# Patient Record
Sex: FEMALE | Race: WHITE | URBAN - METROPOLITAN AREA
[De-identification: names, ages, dates, MRNs, and addresses within clinical notes are randomized per-mention and may not be internally consistent; named-entity substitution may affect disease eponyms.]

---

## 2017-11-17 ENCOUNTER — APPOINTMENT (OUTPATIENT)
Dept: MRI IMAGING | Facility: CLINIC | Age: 67
DRG: 065 | End: 2017-11-17
Attending: EMERGENCY MEDICINE
Payer: MEDICARE

## 2017-11-17 ENCOUNTER — APPOINTMENT (OUTPATIENT)
Dept: CT IMAGING | Facility: CLINIC | Age: 67
DRG: 065 | End: 2017-11-17
Attending: EMERGENCY MEDICINE
Payer: MEDICARE

## 2017-11-17 ENCOUNTER — HOSPITAL ENCOUNTER (INPATIENT)
Facility: CLINIC | Age: 67
LOS: 2 days | Discharge: HOME | DRG: 065 | End: 2017-11-19
Attending: EMERGENCY MEDICINE | Admitting: HOSPITALIST
Payer: MEDICARE

## 2017-11-17 DIAGNOSIS — H53.8 BLURRING OF VISUAL IMAGE OF LEFT EYE: ICD-10-CM

## 2017-11-17 DIAGNOSIS — I63.9 OCCIPITAL STROKE (H): Primary | ICD-10-CM

## 2017-11-17 DIAGNOSIS — I48.0 PAROXYSMAL A-FIB (H): ICD-10-CM

## 2017-11-17 LAB
ANION GAP SERPL CALCULATED.3IONS-SCNC: 6 MMOL/L (ref 3–14)
BASOPHILS # BLD AUTO: 0 10E9/L (ref 0–0.2)
BASOPHILS NFR BLD AUTO: 0.2 %
BUN SERPL-MCNC: 20 MG/DL (ref 7–30)
CALCIUM SERPL-MCNC: 8.9 MG/DL (ref 8.5–10.1)
CHLORIDE SERPL-SCNC: 102 MMOL/L (ref 94–109)
CO2 SERPL-SCNC: 30 MMOL/L (ref 20–32)
CREAT SERPL-MCNC: 0.73 MG/DL (ref 0.52–1.04)
CRP SERPL-MCNC: <2.9 MG/L (ref 0–8)
DIFFERENTIAL METHOD BLD: ABNORMAL
EOSINOPHIL # BLD AUTO: 0.1 10E9/L (ref 0–0.7)
EOSINOPHIL NFR BLD AUTO: 0.8 %
ERYTHROCYTE [DISTWIDTH] IN BLOOD BY AUTOMATED COUNT: 12.1 % (ref 10–15)
ERYTHROCYTE [SEDIMENTATION RATE] IN BLOOD BY WESTERGREN METHOD: 5 MM/H (ref 0–30)
GFR SERPL CREATININE-BSD FRML MDRD: 79 ML/MIN/1.7M2
GLUCOSE SERPL-MCNC: 103 MG/DL (ref 70–99)
HBA1C MFR BLD: 5.9 % (ref 4.3–6)
HCT VFR BLD AUTO: 52 % (ref 35–47)
HGB BLD-MCNC: 17.5 G/DL (ref 11.7–15.7)
IMM GRANULOCYTES # BLD: 0 10E9/L (ref 0–0.4)
IMM GRANULOCYTES NFR BLD: 0.4 %
LYMPHOCYTES # BLD AUTO: 3.8 10E9/L (ref 0.8–5.3)
LYMPHOCYTES NFR BLD AUTO: 33.8 %
MCH RBC QN AUTO: 28.1 PG (ref 26.5–33)
MCHC RBC AUTO-ENTMCNC: 33.7 G/DL (ref 31.5–36.5)
MCV RBC AUTO: 84 FL (ref 78–100)
MONOCYTES # BLD AUTO: 0.5 10E9/L (ref 0–1.3)
MONOCYTES NFR BLD AUTO: 4.8 %
NEUTROPHILS # BLD AUTO: 6.7 10E9/L (ref 1.6–8.3)
NEUTROPHILS NFR BLD AUTO: 60 %
NRBC # BLD AUTO: 0 10*3/UL
NRBC BLD AUTO-RTO: 0 /100
PLATELET # BLD AUTO: 248 10E9/L (ref 150–450)
POTASSIUM SERPL-SCNC: 3.7 MMOL/L (ref 3.4–5.3)
RBC # BLD AUTO: 6.23 10E12/L (ref 3.8–5.2)
SODIUM SERPL-SCNC: 138 MMOL/L (ref 133–144)
T4 FREE SERPL-MCNC: 1.28 NG/DL (ref 0.76–1.46)
TSH SERPL DL<=0.005 MIU/L-ACNC: 4.14 MU/L (ref 0.4–4)
WBC # BLD AUTO: 11.1 10E9/L (ref 4–11)

## 2017-11-17 PROCEDURE — 85025 COMPLETE CBC W/AUTO DIFF WBC: CPT | Performed by: EMERGENCY MEDICINE

## 2017-11-17 PROCEDURE — 25000128 H RX IP 250 OP 636: Performed by: EMERGENCY MEDICINE

## 2017-11-17 PROCEDURE — A9270 NON-COVERED ITEM OR SERVICE: HCPCS | Mod: GY | Performed by: HOSPITALIST

## 2017-11-17 PROCEDURE — 84439 ASSAY OF FREE THYROXINE: CPT | Performed by: EMERGENCY MEDICINE

## 2017-11-17 PROCEDURE — 99285 EMERGENCY DEPT VISIT HI MDM: CPT | Mod: 25

## 2017-11-17 PROCEDURE — 70496 CT ANGIOGRAPHY HEAD: CPT

## 2017-11-17 PROCEDURE — 96375 TX/PRO/DX INJ NEW DRUG ADDON: CPT

## 2017-11-17 PROCEDURE — 93005 ELECTROCARDIOGRAM TRACING: CPT

## 2017-11-17 PROCEDURE — 25000128 H RX IP 250 OP 636: Performed by: RADIOLOGY

## 2017-11-17 PROCEDURE — 25000132 ZZH RX MED GY IP 250 OP 250 PS 637: Mod: GY | Performed by: HOSPITALIST

## 2017-11-17 PROCEDURE — 99223 1ST HOSP IP/OBS HIGH 75: CPT | Mod: AI | Performed by: HOSPITALIST

## 2017-11-17 PROCEDURE — 12000007 ZZH R&B INTERMEDIATE

## 2017-11-17 PROCEDURE — A9270 NON-COVERED ITEM OR SERVICE: HCPCS | Mod: GY | Performed by: EMERGENCY MEDICINE

## 2017-11-17 PROCEDURE — 86140 C-REACTIVE PROTEIN: CPT | Performed by: EMERGENCY MEDICINE

## 2017-11-17 PROCEDURE — 80048 BASIC METABOLIC PNL TOTAL CA: CPT | Performed by: EMERGENCY MEDICINE

## 2017-11-17 PROCEDURE — 96365 THER/PROPH/DIAG IV INF INIT: CPT

## 2017-11-17 PROCEDURE — 96376 TX/PRO/DX INJ SAME DRUG ADON: CPT

## 2017-11-17 PROCEDURE — 83036 HEMOGLOBIN GLYCOSYLATED A1C: CPT | Performed by: EMERGENCY MEDICINE

## 2017-11-17 PROCEDURE — 84443 ASSAY THYROID STIM HORMONE: CPT | Performed by: EMERGENCY MEDICINE

## 2017-11-17 PROCEDURE — 70450 CT HEAD/BRAIN W/O DYE: CPT | Mod: XS

## 2017-11-17 PROCEDURE — A9585 GADOBUTROL INJECTION: HCPCS | Performed by: RADIOLOGY

## 2017-11-17 PROCEDURE — 25000125 ZZHC RX 250: Performed by: EMERGENCY MEDICINE

## 2017-11-17 PROCEDURE — 85652 RBC SED RATE AUTOMATED: CPT | Performed by: EMERGENCY MEDICINE

## 2017-11-17 PROCEDURE — 70553 MRI BRAIN STEM W/O & W/DYE: CPT

## 2017-11-17 PROCEDURE — 25000132 ZZH RX MED GY IP 250 OP 250 PS 637: Mod: GY | Performed by: EMERGENCY MEDICINE

## 2017-11-17 RX ORDER — METOPROLOL TARTRATE 25 MG/1
25 TABLET, FILM COATED ORAL ONCE
Status: COMPLETED | OUTPATIENT
Start: 2017-11-17 | End: 2017-11-17

## 2017-11-17 RX ORDER — FENOFIBRATE 160 MG/1
160 TABLET ORAL DAILY
COMMUNITY

## 2017-11-17 RX ORDER — ONDANSETRON 2 MG/ML
4 INJECTION INTRAMUSCULAR; INTRAVENOUS EVERY 30 MIN PRN
Status: DISCONTINUED | OUTPATIENT
Start: 2017-11-17 | End: 2017-11-17

## 2017-11-17 RX ORDER — HYDROCHLOROTHIAZIDE 25 MG/1
25 TABLET ORAL DAILY
Status: DISCONTINUED | OUTPATIENT
Start: 2017-11-17 | End: 2017-11-18

## 2017-11-17 RX ORDER — ONDANSETRON 2 MG/ML
4 INJECTION INTRAMUSCULAR; INTRAVENOUS EVERY 6 HOURS PRN
Status: DISCONTINUED | OUTPATIENT
Start: 2017-11-17 | End: 2017-11-19 | Stop reason: HOSPADM

## 2017-11-17 RX ORDER — IOPAMIDOL 755 MG/ML
500 INJECTION, SOLUTION INTRAVASCULAR ONCE
Status: COMPLETED | OUTPATIENT
Start: 2017-11-17 | End: 2017-11-17

## 2017-11-17 RX ORDER — METOPROLOL TARTRATE 1 MG/ML
5 INJECTION, SOLUTION INTRAVENOUS ONCE
Status: COMPLETED | OUTPATIENT
Start: 2017-11-17 | End: 2017-11-17

## 2017-11-17 RX ORDER — AMOXICILLIN 250 MG
1 CAPSULE ORAL 2 TIMES DAILY PRN
Status: DISCONTINUED | OUTPATIENT
Start: 2017-11-17 | End: 2017-11-19 | Stop reason: HOSPADM

## 2017-11-17 RX ORDER — METOPROLOL TARTRATE 25 MG/1
25 TABLET, FILM COATED ORAL 2 TIMES DAILY
Status: DISCONTINUED | OUTPATIENT
Start: 2017-11-17 | End: 2017-11-17

## 2017-11-17 RX ORDER — FENOFIBRATE 160 MG/1
160 TABLET ORAL DAILY
Status: DISCONTINUED | OUTPATIENT
Start: 2017-11-17 | End: 2017-11-19 | Stop reason: HOSPADM

## 2017-11-17 RX ORDER — SIMVASTATIN 40 MG
40 TABLET ORAL AT BEDTIME
Status: DISCONTINUED | OUTPATIENT
Start: 2017-11-17 | End: 2017-11-19 | Stop reason: HOSPADM

## 2017-11-17 RX ORDER — GADOBUTROL 604.72 MG/ML
15 INJECTION INTRAVENOUS ONCE
Status: COMPLETED | OUTPATIENT
Start: 2017-11-17 | End: 2017-11-17

## 2017-11-17 RX ORDER — NALOXONE HYDROCHLORIDE 0.4 MG/ML
.1-.4 INJECTION, SOLUTION INTRAMUSCULAR; INTRAVENOUS; SUBCUTANEOUS
Status: DISCONTINUED | OUTPATIENT
Start: 2017-11-17 | End: 2017-11-19 | Stop reason: HOSPADM

## 2017-11-17 RX ORDER — ONDANSETRON 4 MG/1
4 TABLET, ORALLY DISINTEGRATING ORAL EVERY 6 HOURS PRN
Status: DISCONTINUED | OUTPATIENT
Start: 2017-11-17 | End: 2017-11-19 | Stop reason: HOSPADM

## 2017-11-17 RX ORDER — GLUCOSAMINE SULFATE 500 MG
1 CAPSULE ORAL 2 TIMES DAILY
COMMUNITY

## 2017-11-17 RX ORDER — ACETAMINOPHEN 325 MG/1
650 TABLET ORAL EVERY 4 HOURS PRN
Status: DISCONTINUED | OUTPATIENT
Start: 2017-11-17 | End: 2017-11-19 | Stop reason: HOSPADM

## 2017-11-17 RX ORDER — METOPROLOL TARTRATE 50 MG
50 TABLET ORAL 2 TIMES DAILY
Status: DISCONTINUED | OUTPATIENT
Start: 2017-11-18 | End: 2017-11-19 | Stop reason: HOSPADM

## 2017-11-17 RX ORDER — ACETAMINOPHEN 325 MG/1
975 TABLET ORAL ONCE
Status: COMPLETED | OUTPATIENT
Start: 2017-11-17 | End: 2017-11-17

## 2017-11-17 RX ORDER — AMOXICILLIN 250 MG
2 CAPSULE ORAL 2 TIMES DAILY PRN
Status: DISCONTINUED | OUTPATIENT
Start: 2017-11-17 | End: 2017-11-19 | Stop reason: HOSPADM

## 2017-11-17 RX ADMIN — METOPROLOL TARTRATE 5 MG: 5 INJECTION INTRAVENOUS at 19:36

## 2017-11-17 RX ADMIN — METOPROLOL TARTRATE 25 MG: 25 TABLET ORAL at 23:58

## 2017-11-17 RX ADMIN — ONDANSETRON 4 MG: 2 INJECTION INTRAMUSCULAR; INTRAVENOUS at 15:25

## 2017-11-17 RX ADMIN — METOPROLOL TARTRATE 5 MG: 5 INJECTION INTRAVENOUS at 20:11

## 2017-11-17 RX ADMIN — ASPIRIN 325 MG: 325 TABLET, DELAYED RELEASE ORAL at 21:33

## 2017-11-17 RX ADMIN — HEPARIN SODIUM 1050 UNITS/HR: 10000 INJECTION, SOLUTION INTRAVENOUS at 19:57

## 2017-11-17 RX ADMIN — METOPROLOL TARTRATE 25 MG: 25 TABLET ORAL at 21:32

## 2017-11-17 RX ADMIN — IOPAMIDOL 70 ML: 755 INJECTION, SOLUTION INTRAVENOUS at 15:47

## 2017-11-17 RX ADMIN — HYDROCHLOROTHIAZIDE 25 MG: 25 TABLET ORAL at 23:58

## 2017-11-17 RX ADMIN — GADOBUTROL 15 ML: 604.72 INJECTION INTRAVENOUS at 16:52

## 2017-11-17 RX ADMIN — ACETAMINOPHEN 975 MG: 325 TABLET, FILM COATED ORAL at 15:25

## 2017-11-17 RX ADMIN — FENOFIBRATE 160 MG: 160 TABLET ORAL at 21:32

## 2017-11-17 RX ADMIN — SIMVASTATIN 40 MG: 40 TABLET, FILM COATED ORAL at 21:32

## 2017-11-17 RX ADMIN — SODIUM CHLORIDE 80 ML: 9 INJECTION, SOLUTION INTRAVENOUS at 15:47

## 2017-11-17 ASSESSMENT — ENCOUNTER SYMPTOMS
FEVER: 0
NECK STIFFNESS: 0
HEADACHES: 1
ABDOMINAL PAIN: 0
VOMITING: 0

## 2017-11-17 ASSESSMENT — ACTIVITIES OF DAILY LIVING (ADL)
BATHING: 0-->INDEPENDENT
RETIRED_EATING: 0-->INDEPENDENT
SWALLOWING: 0-->SWALLOWS FOODS/LIQUIDS WITHOUT DIFFICULTY
RETIRED_COMMUNICATION: 0-->UNDERSTANDS/COMMUNICATES WITHOUT DIFFICULTY
FALL_HISTORY_WITHIN_LAST_SIX_MONTHS: NO
DRESS: 0-->INDEPENDENT
COGNITION: 0 - NO COGNITION ISSUES REPORTED
AMBULATION: 1-->ASSISTIVE EQUIPMENT
TRANSFERRING: 1-->ASSISTIVE EQUIPMENT
TOILETING: 1-->ASSISTIVE EQUIPMENT

## 2017-11-17 NOTE — Clinical Note
Admitting Physician: CARMITA POZO [[id number]]   [id number]   Bed Type: Adult Med/Surg [46]   Special needs: Fall Risk [8]   Bed request comments: Approx 300 lbs / independent transfer / bed request time 7589

## 2017-11-17 NOTE — IP AVS SNAPSHOT
[place] [address] [facility]    [address]    [facility] [address]    Phone:  [phone]    Fax:  [phone]                                       After Visit Summary   11/17/2017    Es Valdez    MRN: [id number]           After Visit Summary Signature Page     I have received my discharge instructions, and my questions have been answered. I have discussed any challenges I see with this plan with the nurse or doctor.    ..........................................................................................................................................  Patient/Patient Representative Signature      ..........................................................................................................................................  Patient Representative Print Name and Relationship to Patient    ..................................................               ................................................  Date                                            Time    ..........................................................................................................................................  Reviewed by Signature/Title    ...................................................              ..............................................  Date                                                            Time

## 2017-11-17 NOTE — ED NOTES
"[facility]  ED Nurse Handoff Report    Es Valdez is a 67 year old female   ED Chief complaint: Headache and vision changes  . ED Diagnosis:   Final diagnoses:   None     Allergies:   Allergies   Allergen Reactions     Seasonal Allergies        Code Status: Full Code  Activity level - Baseline/Home:  Independent. Activity Level - Current:   Independent. Lift room needed: No. Bariatric: She is right around 300lb but is independent   Needed: No   Isolation: No. Infection: Not Applicable.     Vital Signs:   Vitals:    11/17/17 1600 11/17/17 1606 11/17/17 1607 11/17/17 1740   BP: 99/80 (!) 118/98 98/88    Pulse:       Resp:       Temp:       SpO2:  (!) 89% 94% 96%   Weight:       Height:           Cardiac Rhythm:  ,      Pain level: 0-10 Pain Scale: 5  Patient confused: No. Patient Falls Risk: Yes.   Elimination Status: Has voided   Patient Report - Initial Complaint: Pt states left eye had some areas in her field of vision where she couldn't see starting Wednesday. Focused Assessment: PERRLA, gait is steady but she does get slightly dizzy when changing positions.  Peripheral vision is normal.  Pt states a mild \"sinus headache\" but she has had migraines back before 2011 she stated.  States she tried Tylenol and Motrin without relief.   Tests Performed: EKG, labs, CT, MRI. Abnormal Results: Multiple infarcts occipital lobe.   Treatments provided: Monitoring, Tylenol and Zofran  Family Comments:  at bedside  OBS brochure/video discussed/provided to patient:  No  ED Medications:   Medications   ondansetron (ZOFRAN) injection 4 mg (4 mg Intravenous Given 11/17/17 1525)   acetaminophen (TYLENOL) tablet 975 mg (975 mg Oral Given 11/17/17 1525)   0.9% sodium chloride BOLUS (0 mLs Intravenous Stopped 11/17/17 1548)   iopamidol (ISOVUE-370) solution 500 mL (70 mLs Intravenous Given 11/17/17 1547)   gadobutrol (GADAVIST) injection 15 mL (15 mLs Intravenous Given 11/17/17 1652)     Drips " infusing:  No  For the majority of the shift, the patient's behavior Green. Interventions performed were monitoring.     Severe Sepsis OR Septic Shock Diagnosis Present: No      ED Nurse Name/Phone Number: LUIS Barrow RN  5:41 PM    RECEIVING UNIT ED HANDOFF REVIEW    Above ED Nurse Handoff Report was reviewed: Yes  Reviewed by: ANAHI HUMPHRIES on November 17, 2017 at 7:28 PM

## 2017-11-17 NOTE — IP AVS SNAPSHOT
MRN:[id number]                      After Visit Summary   11/17/2017    Es Valdez    MRN: [id number]           Thank you!     Thank you for choosing [facility] for your care. Our goal is always to provide you with excellent care. Hearing back from our patients is one way we can continue to improve our services. Please take a few minutes to complete the written survey that you may receive in the mail after you visit. If you would like to speak to someone directly about your visit please contact Patient Relations at [phone]. Thank you!          Patient Information     Date Of Birth          1950        Designated Caregiver       Most Recent Value    Caregiver    Will someone help with your care after discharge? no      About your hospital stay     You were admitted on:  November 17, 2017 You last received care in the:  [address] [facility]    You were discharged on:  November 19, 2017        Reason for your hospital stay       You were hospitalized for a new stroke that caused blurry vision.  This is is likely from a clot forming in your heart from atrial fibrillation, an abnormal hear rhythm that you are going in and out of.  We recommend a blood thinner indefinitely which we discussed to prevent stroke.  Stop taking your aspirin due to increased bleeding risk with this.  Recommend not driving until vision symptoms improve and you get OK from you clinic provider.  Your metoprolol dose has been increased to help with heart rate control for the afib.                  Who to Call     For medical emergencies, please call 911.  For non-urgent questions about your medical care, please call your primary care provider or clinic, [phone]          Attending Provider     Provider Specialty    Denae Wiggins MD Emergency Medicine    VinesAugusto MD Family Practice       Primary Care Provider Office Phone # Fax #    [facility]  [facility] [phone]      After Care Instructions     Activity       Your activity upon discharge: activity as tolerated            Diet       Follow this diet upon discharge: Orders Placed This Encounter      Combination Diet Regular Diet Adult; Thin Liquids (water, ice chips, juice, milk, gelatin, ice cream, etc)                  Follow-up Appointments     Follow-up and recommended labs and tests        Follow up with primary care provider, [facility], within 7 days to evaluate medication change for stroke.  No follow up labs or test are needed.                  Additional Services     Occupational Therapy Referral       *This therapy referral will be filtered to a centralized scheduling office at [facility] and the patient will receive a call to schedule an appointment at a [facility] location most convenient for them. *     [facility] provides Occupational Therapy evaluation and treatment and many specialty services across the [facility] system.  If requesting a specialty program, please choose from the list below.    If you have not heard from the scheduling office within 2 business days, please call [phone] for all locations, with the exception of [place], please call [phone].     Treatment: Evaluation & Treatment  Special Instructions/Modalities: Visual rehabilitation following stroke  Special Programs: Low Vision    Please be aware that coverage of these services is subject to the terms and limitations of your health insurance plan.  Call member services at your health plan with any benefit or coverage questions.      **Note to Provider:  If you are referring outside of [facility] for the therapy appointment, please list the name of the location in the \"special instructions\" above, print the referral and give to the patient to schedule the appointment.                  Pending Results     Date and Time Order Name Status " "Description    2017 0906 EKG 12-lead, tracing only Preliminary             Statement of Approval     Ordered          17 0954  I have reviewed and agree with all the recommendations and orders detailed in this document.  EFFECTIVE NOW     Approved and electronically signed by:  Favio Fortune MD             Admission Information     Date & Time Provider Department Dept. Phone    2017 Augusto Vines MD [place] [address] [facility] [phone]      Your Vitals Were     Blood Pressure Pulse Temperature Respirations Height Weight    146/77 (BP Location: Left arm) 70 98.5  F (36.9  C) (Oral) 18 1.626 m (5' 4\") 138.8 kg (305 lb 14.4 oz)    Pulse Oximetry BMI (Body Mass Index)                94% 52.51 kg/m2          [employer] Information     [employer] lets you send messages to your doctor, view your test results, renew your prescriptions, schedule appointments and more. To sign up, go to [email or web address] . Click on \"Log in\" on the left side of the screen, which will take you to the Welcome page. Then click on \"Sign up Now\" on the right side of the page.     You will be asked to enter the access code listed below, as well as some personal information. Please follow the directions to create your username and password.     Your access code is: [email or web address]  Expires: 2018 10:21 AM     Your access code will  in 90 days. If you need help or a new code, please call your [place] clinic or [phone].        Care EveryWhere ID     This is your Care EveryWhere ID. This could be used by other organizations to access your [place] medical records  [id number]        Equal Access to Services     [facility] HILDA AH: Hadii marian Varela, uriel you, qaeula kaalmada hallie, celeste estrada. So [facility] [phone].    ATENCIÓN: Si habla español, tiene a jain disposición servicios gratuitos de asistencia lingüística. Llame al [phone].    We comply " with applicable federal civil rights laws and [place] laws. We do not discriminate on the basis of race, color, national origin, age, disability, sex, sexual orientation, or gender identity.               Review of your medicines      START taking        Dose / Directions    rivaroxaban ANTICOAGULANT 20 MG Tabs tablet   Commonly known as:  XARELTO   Used for:  Occipital stroke (H), Paroxysmal a-fib (H)   Notes to Patient:  Take at 2 pm Monday, then 5 pm Tuesday and with dinner daily.        Dose:  20 mg   Take 1 tablet (20 mg) by mouth daily (with dinner)   Quantity:  30 tablet   Refills:  1         CONTINUE these medicines which may have CHANGED, or have new prescriptions. If we are uncertain of the size of tablets/capsules you have at home, strength may be listed as something that might have changed.        Dose / Directions    metoprolol 50 MG tablet   Commonly known as:  LOPRESSOR   This may have changed:    - medication strength  - how much to take   Used for:  Paroxysmal a-fib (H)        Dose:  50 mg   Take 1 tablet (50 mg) by mouth 2 times daily   Quantity:  60 tablet   Refills:  1         CONTINUE these medicines which have NOT CHANGED        Dose / Directions    fenofibrate 160 MG tablet        Dose:  160 mg   Take 160 mg by mouth daily   Refills:  0       FISH OIL PO        Dose:  1 capsule   Take 1 capsule by mouth 2 times daily   Refills:  0       glucosamine 500 MG Caps        Dose:  1 tablet   Take 1 tablet by mouth 2 times daily   Refills:  0       HYDROCHLOROTHIAZIDE PO        Dose:  25 mg   Take 25 mg by mouth daily   Refills:  0       OMEPRAZOLE PO        Dose:  20 mg   Take 20 mg by mouth   Refills:  0       SIMVASTATIN PO        Dose:  40 mg   Take 40 mg by mouth At Bedtime   Refills:  0       ZYRTEC ALLERGY PO        Dose:  10 mg   Take 10 mg by mouth   Refills:  0         STOP taking     ASPIRIN PO                Where to get your medicines      These medications were sent to [facility]  [place], [place] - [address] [address]  [address] [address], [facility] [address]     Phone:  [phone]     metoprolol 50 MG tablet    rivaroxaban ANTICOAGULANT 20 MG Tabs tablet                Protect others around you: Learn how to safely use, store and throw away your medicines at www.disposemymeds.org.             Medication List: This is a list of all your medications and when to take them. Check marks below indicate your daily home schedule. Keep this list as a reference.      Medications           Morning Afternoon Evening Bedtime As Needed    fenofibrate 160 MG tablet   Take 160 mg by mouth daily   Last time this was given:  160 mg on 11/19/2017  9:18 AM                                   FISH OIL PO   Take 1 capsule by mouth 2 times daily                                      glucosamine 500 MG Caps   Take 1 tablet by mouth 2 times daily                                      HYDROCHLOROTHIAZIDE PO   Take 25 mg by mouth daily   Last time this was given:  25 mg on 11/17/2017 11:58 PM                                   metoprolol 50 MG tablet   Commonly known as:  LOPRESSOR   Take 1 tablet (50 mg) by mouth 2 times daily   Last time this was given:  50 mg on 11/19/2017  9:18 AM                                      OMEPRAZOLE PO   Take 20 mg by mouth   Last time this was given:  20 mg on 11/19/2017  9:18 AM                                   rivaroxaban ANTICOAGULANT 20 MG Tabs tablet   Commonly known as:  XARELTO   Take 1 tablet (20 mg) by mouth daily (with dinner)   Last time this was given:  20 mg on 11/19/2017 10:32 AM   Notes to Patient:  Take at 2 pm Monday, then 5 pm Tuesday and with dinner daily.                                   SIMVASTATIN PO   Take 40 mg by mouth At Bedtime   Last time this was given:  40 mg on 11/18/2017  9:17 PM                                   ZYRTEC ALLERGY PO   Take 10 mg by mouth                                             More Information        Stroke  (Completed)    You have had a mild stroke, or cerebrovascular accident (CVA). This is caused by a loss of blood flow to part of your brain. This can occur when a blood clot forms inside the carotid artery (main artery from the heart to the brain) or inside the heart. When the clot travels to the brain, it can lodge in a blood vessel and block blood flow. The other common cause of stroke is a gradual narrowing of the arteries in the brain due to buildup of fatty deposits (plaque).  Symptoms  Blocked blood flow in different areas of the brain can cause different symptoms. If you have had a stroke before, a new one may be different. A memory aid for the basic signs of a stroke is F.A.S.T.  F.A.S.T.    F: Face drooping, or numbness on one side. This may be more noticeable when you ask the affected person to smile.    A: Arm weakness or numbness. The affected person may have trouble using or lifting one side.    S: Speech difficulty. Speech may be slurred or hard to understand. The affected person may also use the wrong words.    T: Time to call 911. Time is critical in treating a stroke. Call 911 as soon as you suspect a stroke has happened--even a small one. The sooner treatment is started the better, even if the symptoms go away.  Other common symptoms of a stroke include:    Having difficulty getting the right words to come out    Weakness in one leg    Numbness on one side    Difficulty walking    Trouble with coordination    Trouble with vision    Headache    Confusion    Dizziness  Treatment  After you have had a stroke, you are at risk of having another. Be sure to follow up with your healthcare provider for further evaluation and treatment. If problems are found, your healthcare provider will recommend treatment with medicines and/or procedures.  To reduce your chance of having another stroke, you may be prescribed medicines. These include medicines to prevent blood clots, such as antiplatelet or anticoagulant  medicines.  Home care    Rest at home and avoid exertion for the next few days.    If your healthcare provider has prescribed medicines, take them as directed.  Follow-up care  Follow up with your healthcare provider, or as advised. Additional tests may be needed. If you had an X-ray, CT scan, MRI, or ECG (electrocardiogram), it will be reviewed by a specialist. You will be notified of any new findings that will affect your care.  Call 911  Contact emergency services right away if any of these occur:    Any of your stroke symptoms worsen    New problems with speech, confusion, vision, walking, coordination, facial droop, or weakness or numbness on one side of your body    Severe headache, fainting spell, dizziness, or seizure    Chest pain or shortness of breath  Remember F.A.S.T. (described above). If you notice warning signs and symptoms of stroke, CALL 911 without delay.  Date Last Reviewed: 9/21/2015 2000-2017 [employer]. [address] [place] [address]. All rights reserved. This information is not intended as a substitute for professional medical care. Always follow your healthcare professional's instructions.                Patient Education    Rivaroxaban Oral tablet    Rivaroxaban Oral tablet, Rivaroxaban Oral tablet  Rivaroxaban Oral tablet  What is this medicine?  RIVAROXABAN (ri va YNES a ban) is an anticoagulant (blood thinner). It is used to treat blood clots in the lungs or in the veins. It is also used after knee or hip surgeries to prevent blood clots. It is also used to lower the chance of stroke in people with a medical condition called atrial fibrillation.  This medicine may be used for other purposes; ask your health care provider or pharmacist if you have questions.  What should I tell my health care provider before I take this medicine?  They need to know if you have any of these conditions:    bleeding disorders    bleeding in the brain    blood in your stools (black  or tarry stools) or if you have blood in your vomit    history of stomach bleeding    kidney disease    liver disease    low blood counts, like low white cell, platelet, or red cell counts    recent or planned spinal or epidural procedure    take medicines that treat or prevent blood clots    an unusual or allergic reaction to rivaroxaban, other medicines, foods, dyes, or preservatives    pregnant or trying to get pregnant    breast-feeding  How should I use this medicine?  Take this medicine by mouth with a glass of water. Follow the directions on the prescription label. Take your medicine at regular intervals. Do not take it more often than directed. Do not stop taking except on your doctor's advice. Stopping this medicine may increase your risk of a blood clot. Be sure to refill your prescription before you run out of medicine.  If you are taking this medicine after hip or knee replacement surgery, take it with or without food. If you are taking this medicine for atrial fibrillation, take it with your evening meal. If you are taking this medicine to treat blood clots, take it with food at the same time each day. If you are unable to swallow your tablet, you may crush the tablet and mix it in applesauce. Then, immediately eat the applesauce. You should eat more food right after you eat the applesauce containing the crushed tablet.  Talk to your pediatrician regarding the use of this medicine in children. Special care may be needed.  Overdosage: If you think you have taken too much of this medicine contact a poison control center or emergency room at once.  NOTE: This medicine is only for you. Do not share this medicine with others.  What if I miss a dose?  If you take your medicine once a day and miss a dose, take the missed dose as soon as you remember. If you take your medicine twice a day and miss a dose, take the missed dose immediately. In this instance, 2 tablets may be taken at the same time. The next day  you should take 1 tablet twice a day as directed.  What may interact with this medicine?    aspirin and aspirin-like medicines    certain antibiotics like erythromycin, azithromycin, and clarithromycin    certain medicines for fungal infections like ketoconazole and itraconazole    certain medicines for irregular heart beat like amiodarone, quinidine, dronedarone    certain medicines for seizures like carbamazepine, phenytoin    certain medicines that treat or prevent blood clots like warfarin, enoxaparin, and dalteparin    conivaptan    diltiazem    felodipine    indinavir    lopinavir; ritonavir    NSAIDS, medicines for pain and inflammation, like ibuprofen or naproxen    ranolazine    rifampin    ritonavir    [place]'s wort    verapamil  This list may not describe all possible interactions. Give your health care provider a list of all the medicines, herbs, non-prescription drugs, or dietary supplements you use. Also tell them if you smoke, drink alcohol, or use illegal drugs. Some items may interact with your medicine.  What should I watch for while using this medicine?  Visit your doctor or health care professional for regular checks on your progress. Your condition will be monitored carefully while you are receiving this medicine.  Notify your doctor or health care professional and seek emergency treatment if you develop breathing problems; changes in vision; chest pain; severe, sudden headache; pain, swelling, warmth in the leg; trouble speaking; sudden numbness or weakness of the face, arm, or leg. These can be signs that your condition has gotten worse.  If you are going to have surgery, tell your doctor or health care professional that you are taking this medicine.  Tell your health care professional that you use this medicine before you have a spinal or epidural procedure. Sometimes people who take this medicine have bleeding problems around the spine when they have a spinal or epidural procedure. This  bleeding is very rare. If you have a spinal or epidural procedure while on this medicine, call your health care professional immediately if you have back pain, numbness or tingling (especially in your legs and feet), muscle weakness, paralysis, or loss of bladder or bowel control.  Avoid sports and activities that might cause injury while you are using this medicine. Severe falls or injuries can cause unseen bleeding. Be careful when using sharp tools or knives. Consider using an electric razor. Take special care brushing or flossing your teeth. Report any injuries, bruising, or red spots on the skin to your doctor or health care professional.  What side effects may I notice from receiving this medicine?  Side effects that you should report to your doctor or health care professional as soon as possible:    allergic reactions like skin rash, itching or hives, swelling of the face, lips, or tongue    back pain    redness, blistering, peeling or loosening of the skin, including inside the mouth    signs and symptoms of bleeding such as bloody or black, tarry stools; red or dark-brown urine; spitting up blood or brown material that looks like coffee grounds; red spots on the skin; unusual bruising or bleeding from the eye, gums, or nose  Side effects that usually do not require medical attention (Report these to your doctor or health care professional if they continue or are bothersome.):    dizziness    muscle pain  This list may not describe all possible side effects. Call your doctor for medical advice about side effects. You may report side effects to FDA at 6-907-FDA-3546.  Where should I keep my medicine?  Keep out of the reach of children.  Store at room temperature between 15 and 30 degrees C (59 and 86 degrees F). Throw away any unused medicine after the expiration date.  NOTE: This sheet is a summary. It may not cover all possible information. If you have questions about this medicine, talk to your doctor,  pharmacist, or health care provider.  NOTE:This sheet is a summary. It may not cover all possible information. If you have questions about this medicine, talk to your doctor, pharmacist, or health care provider. Copyright  2016 [employer]

## 2017-11-17 NOTE — ED NOTES
MD in room updated pt on plan of care and likely stroke.  Plan for MRI. MRI checklist given to pt.  and call light at bedside.

## 2017-11-17 NOTE — ED PROVIDER NOTES
"  History     Chief Complaint:  Headache and vision changes       HPI   Es Valdez is a 67 year old female who presents with left sided headache and vision changes.  She first noticed this 2 days ago.  She noticed blurred vision and had floaters in her left eye.  She feels like there is a persistent spot in her left visual field that she has difficult seeing.  She also reports a diffuse headache, non radiating, 6/10.  It came on suddenly the same night as the vision changes.  She took Advil and Aleve for the headache, which helped.  Patient denies any numbness, weakness, slurred speech, or facial droop.  She denies any history of stroke.    Allergies:  Seasonal Allergies     Medications:    Simvastatin   Aspirin  Omeprazole  Fenofibrate  Hydrochlorothiazide  Metoprolol  Zyrtec    Past Medical History:    HLD  High triglycerides    Past Surgical History:    Cholecystectomy  Orthopedic Surgery      Family History:    History reviewed. No pertinent family history.      Social History:  Presents wit    Tobacco use: No  Alcohol use: No  PCP: [facility]      Review of Systems   Constitutional: Negative for fever.   Eyes: Positive for visual disturbance.   Cardiovascular: Negative for chest pain.   Gastrointestinal: Negative for abdominal pain and vomiting.   Musculoskeletal: Negative for neck stiffness.   Neurological: Positive for headaches.   All other systems reviewed and are negative.    Physical Exam     Patient Vitals for the past 24 hrs:   BP Temp Pulse Resp SpO2 Height Weight   11/17/17 1815 - - - - 97 % - -   11/17/17 1745 - - - - 95 % - -   11/17/17 1740 - - - - 96 % - -   11/17/17 1607 98/88 - - - 94 % - -   11/17/17 1606 (!) 118/98 - - - (!) 89 % - -   11/17/17 1600 99/80 - - - - - -   11/17/17 1530 116/83 - - - 98 % - -   11/17/17 1515 (!) 121/98 - 88 18 98 % 1.626 m (5' 4\") 136.1 kg (300 lb)   11/17/17 1512 (!) 110/96 - - - 97 % - -   11/17/17 1405 (!) 105/97 97.2  F " (36.2  C) 81 18 97 % - -        Physical Exam  Constitutional: Alert, attentive, GCS 15. Well appearing elderly woman in no acute distress.  HENT: Cranial nerves 3-12 intact except for decreased vision in the lateral half of her left visual field.    Eyes: Vision changes as described above with consensual field testing. No erythema, conjunctival discharge, or pain with EOM.   Nose: Nose normal.    Mouth/Throat: Oropharynx is clear, mucous membranes are moist   Eyes: Normal conjunctiva. Pupils are equal, round, and reactive to light.   CV: regular rate and rhythm; no murmurs, rubs or gallups  Chest: Effort normal and breath sounds normal.   GI:  There is no tenderness. No distension. Normal bowel sounds  MSK: Normal range of motion.   Neurological: Alert, attentive. Strength and sensation intact. Oriented x 4.   Skin: Skin is warm and dry.    Emergency Department Course   ECG (18:51:36):  Rate 148 bpm. IN interval *. QRS duration 62. QT/QTc 190/298. P-R-T axes * 21 90. Undetermined rhythm. Nonspecific ST and T wave abnormality. Irregularly irregular rhythm. Possibly A fib with RVR vs. A flutter. Abnormal ECG Interpreted at 1934 by Denae Wiggins MD.     Imaging:  CT Head w/o Contrast:   IMPRESSION:   1. Probable acute infarct in the medial right occipital lobe. No  hemorrhage.  2.  Atrophy of the brain.  White matter changes consistent with sequelae of small vessel ischemic disease.  3. Arachnoid cyst in the left sylvian fissure extending superiorly along the lateral left frontal lobe.      CT Head Angiogram w & w/o Contrast:  IMPRESSION:   1. Tortuous internal carotid arteries.  2. Mild calcified plaque distal left common carotid artery and proximal left internal carotid artery and in the left carotid siphon.  3. No significant stenosis. No arterial occlusion.    Results per radiology.     MRI Brain w & w/o Contrast   1. Recent small infarcts scattered throughout the right occipital  lobe. No other recent  infarct.  2. Diffuse cerebral volume loss and cerebral white matter changes  consistent with chronic small vessel ischemic disease.    ED point-of-care ultrasound  Ocular ultrasound  Indication: Left eye vision changes  Technique: A linear probe was used to evaluate the left eye  No evidence of left eye retinal detachment or vitreous hemorrhage.  Images were unable to be saved to PACS due to dysfunction with the ultrasound up loading system.  Impression: No evidence of left eye retinal detachment     Laboratory:  CBC: WBC 11.1 (H), HGB 17.5 (H) o/w WNL ()    BMP: Glucose 103 (H) o/w WNL (Creatinine 0.73)   CPR Inflammation: <2.9   ESR: 5    Interventions:  1525: Zofran 4 mg IV  1525: Tylenol 975 mg PO  1525: NS 1L IV Bolus   1652: Gadavist 15 mL   1933 Metoprolol 5 mg IV  Heparin drip IV  2008 Metoprolol 5 mg IV    Emergency Department Course:  Past medical records, nursing notes, and vitals reviewed.  1500: I performed an exam of the patient and obtained history, as documented above.  IV inserted and blood drawn.   Above interventions provided.   The patient was sent for a head CT and CT head angiogram while in the emergency department, findings above.   1745: I spoke with Dr. Barcenas, from neurology, regarding this patient.   I personally reviewed the laboratory results with the Patient and answered all related questions prior to admission.  Findings and plan explained to the Patient and spouse who consents to admission.   1940: Discussed the patient with Dr. Mendez, who will admit the patient to a medical telemetry bed for further monitoring, evaluation, and treatment.        Impression & Plan    Medical Decision Making:  Es Valdez is a 67 year old female who presents for evaluation of headache and vision changes.  This is consistent with a cerebrovascular accident, with multiple right-sided occipital strokes seen on MRI .  Based on  symptoms being present for multiple days, they are not a  candidate for TPA as they are outside of the TPA window. I did discuss this with the patient and they express understanding. Differential considered for symptoms included CVA, TIA,  retinal detachment, dissection, cerebral tumor, migraine, infection, metabolic derangement, demyelination, etc.  Eat the end of her ED stay showed tachycardia with with irregularly irregular rhythm, concerning for atrial fibrillation with RVR versus atrial flutter.  She was given multiple doses of metoprolol IV, and her heart rate improved.    Patient was also started on heparin drip as Dr. Vines and I are concerned that she has a left atrial clot causing embolic strokes.  Will admit to medicine with neurology consulting for further cares.  Will need further workup as inpatient with echocardiogram as she is likely having embolic strokes.     Diagnosis:    ICD-10-CM   1. Occipital stroke (H) I63.9   2. Blurring of visual image of left eye H53.8      Disposition:  Admitted to medical telemetry bed.    11/17/2017   Denae Wiggins MD   I, Ana Hogan, am serving as a scribe at 3:00 PM on 11/17/2017 to document services personally performed by Denae Wiggins MD based on my observations and the provider's statements to me.       Denae Wiggins MD  11/17/17 [id number]

## 2017-11-17 NOTE — ED NOTES
Patient comes in for evaluation of headache and vision changes which started Wednesday afternoon. States she cannot see out of her left eye. ABCs intact.

## 2017-11-18 ENCOUNTER — APPOINTMENT (OUTPATIENT)
Dept: CARDIOLOGY | Facility: CLINIC | Age: 67
DRG: 065 | End: 2017-11-18
Attending: HOSPITALIST
Payer: MEDICARE

## 2017-11-18 ENCOUNTER — APPOINTMENT (OUTPATIENT)
Dept: SPEECH THERAPY | Facility: CLINIC | Age: 67
DRG: 065 | End: 2017-11-18
Payer: MEDICARE

## 2017-11-18 LAB
ALBUMIN SERPL-MCNC: 3 G/DL (ref 3.4–5)
ALP SERPL-CCNC: 64 U/L (ref 40–150)
ALT SERPL W P-5'-P-CCNC: 20 U/L (ref 0–50)
ANION GAP SERPL CALCULATED.3IONS-SCNC: 6 MMOL/L (ref 3–14)
AST SERPL W P-5'-P-CCNC: 13 U/L (ref 0–45)
BASOPHILS # BLD AUTO: 0 10E9/L (ref 0–0.2)
BASOPHILS NFR BLD AUTO: 0.1 %
BILIRUB SERPL-MCNC: 0.4 MG/DL (ref 0.2–1.3)
BUN SERPL-MCNC: 20 MG/DL (ref 7–30)
CALCIUM SERPL-MCNC: 8.4 MG/DL (ref 8.5–10.1)
CHLORIDE SERPL-SCNC: 103 MMOL/L (ref 94–109)
CHOLEST SERPL-MCNC: 121 MG/DL
CO2 SERPL-SCNC: 30 MMOL/L (ref 20–32)
CREAT SERPL-MCNC: 0.75 MG/DL (ref 0.52–1.04)
DIFFERENTIAL METHOD BLD: ABNORMAL
EOSINOPHIL # BLD AUTO: 0.1 10E9/L (ref 0–0.7)
EOSINOPHIL NFR BLD AUTO: 0.6 %
ERYTHROCYTE [DISTWIDTH] IN BLOOD BY AUTOMATED COUNT: 12.1 % (ref 10–15)
GFR SERPL CREATININE-BSD FRML MDRD: 77 ML/MIN/1.7M2
GLUCOSE SERPL-MCNC: 104 MG/DL (ref 70–99)
HCT VFR BLD AUTO: 47.3 % (ref 35–47)
HDLC SERPL-MCNC: 42 MG/DL
HGB BLD-MCNC: 15.6 G/DL (ref 11.7–15.7)
IMM GRANULOCYTES # BLD: 0 10E9/L (ref 0–0.4)
IMM GRANULOCYTES NFR BLD: 0.4 %
LDLC SERPL CALC-MCNC: 53 MG/DL
LMWH PPP CHRO-ACNC: 0.21 IU/ML
LMWH PPP CHRO-ACNC: <0.1 IU/ML
LYMPHOCYTES # BLD AUTO: 3.3 10E9/L (ref 0.8–5.3)
LYMPHOCYTES NFR BLD AUTO: 31.7 %
MCH RBC QN AUTO: 27.7 PG (ref 26.5–33)
MCHC RBC AUTO-ENTMCNC: 33 G/DL (ref 31.5–36.5)
MCV RBC AUTO: 84 FL (ref 78–100)
MONOCYTES # BLD AUTO: 0.5 10E9/L (ref 0–1.3)
MONOCYTES NFR BLD AUTO: 4.5 %
NEUTROPHILS # BLD AUTO: 6.5 10E9/L (ref 1.6–8.3)
NEUTROPHILS NFR BLD AUTO: 62.7 %
NONHDLC SERPL-MCNC: 79 MG/DL
NRBC # BLD AUTO: 0 10*3/UL
NRBC BLD AUTO-RTO: 0 /100
PLATELET # BLD AUTO: 229 10E9/L (ref 150–450)
POTASSIUM SERPL-SCNC: 3.5 MMOL/L (ref 3.4–5.3)
PROT SERPL-MCNC: 6.4 G/DL (ref 6.8–8.8)
RBC # BLD AUTO: 5.64 10E12/L (ref 3.8–5.2)
SODIUM SERPL-SCNC: 139 MMOL/L (ref 133–144)
TRIGL SERPL-MCNC: 129 MG/DL
WBC # BLD AUTO: 10.3 10E9/L (ref 4–11)

## 2017-11-18 PROCEDURE — 93306 TTE W/DOPPLER COMPLETE: CPT

## 2017-11-18 PROCEDURE — 25500064 ZZH RX 255 OP 636: Performed by: HOSPITALIST

## 2017-11-18 PROCEDURE — 92610 EVALUATE SWALLOWING FUNCTION: CPT | Mod: GN | Performed by: SPEECH-LANGUAGE PATHOLOGIST

## 2017-11-18 PROCEDURE — 40000275 ZZH STATISTIC RCP TIME EA 10 MIN

## 2017-11-18 PROCEDURE — 85520 HEPARIN ASSAY: CPT | Performed by: INTERNAL MEDICINE

## 2017-11-18 PROCEDURE — 99222 1ST HOSP IP/OBS MODERATE 55: CPT | Mod: 25 | Performed by: INTERNAL MEDICINE

## 2017-11-18 PROCEDURE — 40000225 ZZH STATISTIC SLP WARD VISIT: Performed by: SPEECH-LANGUAGE PATHOLOGIST

## 2017-11-18 PROCEDURE — 80053 COMPREHEN METABOLIC PANEL: CPT | Performed by: HOSPITALIST

## 2017-11-18 PROCEDURE — A9270 NON-COVERED ITEM OR SERVICE: HCPCS | Mod: GY | Performed by: HOSPITALIST

## 2017-11-18 PROCEDURE — 36415 COLL VENOUS BLD VENIPUNCTURE: CPT | Performed by: INTERNAL MEDICINE

## 2017-11-18 PROCEDURE — 40000264 ECHO COMPLETE WITH LUMASON

## 2017-11-18 PROCEDURE — 25000128 H RX IP 250 OP 636: Performed by: INTERNAL MEDICINE

## 2017-11-18 PROCEDURE — 99232 SBSQ HOSP IP/OBS MODERATE 35: CPT | Performed by: INTERNAL MEDICINE

## 2017-11-18 PROCEDURE — 93005 ELECTROCARDIOGRAM TRACING: CPT

## 2017-11-18 PROCEDURE — 25000128 H RX IP 250 OP 636: Performed by: HOSPITALIST

## 2017-11-18 PROCEDURE — 85520 HEPARIN ASSAY: CPT | Performed by: EMERGENCY MEDICINE

## 2017-11-18 PROCEDURE — 12000007 ZZH R&B INTERMEDIATE

## 2017-11-18 PROCEDURE — 36415 COLL VENOUS BLD VENIPUNCTURE: CPT | Performed by: HOSPITALIST

## 2017-11-18 PROCEDURE — 80061 LIPID PANEL: CPT | Performed by: HOSPITALIST

## 2017-11-18 PROCEDURE — 25000132 ZZH RX MED GY IP 250 OP 250 PS 637: Mod: GY | Performed by: HOSPITALIST

## 2017-11-18 PROCEDURE — 93010 ELECTROCARDIOGRAM REPORT: CPT | Performed by: INTERNAL MEDICINE

## 2017-11-18 PROCEDURE — 93306 TTE W/DOPPLER COMPLETE: CPT | Mod: 26 | Performed by: INTERNAL MEDICINE

## 2017-11-18 PROCEDURE — 85025 COMPLETE CBC W/AUTO DIFF WBC: CPT | Performed by: HOSPITALIST

## 2017-11-18 RX ADMIN — HEPARIN SODIUM 1400 UNITS/HR: 10000 INJECTION, SOLUTION INTRAVENOUS at 19:59

## 2017-11-18 RX ADMIN — SULFUR HEXAFLUORIDE 5 ML: KIT at 11:00

## 2017-11-18 RX ADMIN — FENOFIBRATE 160 MG: 160 TABLET ORAL at 08:22

## 2017-11-18 RX ADMIN — SIMVASTATIN 40 MG: 40 TABLET, FILM COATED ORAL at 21:17

## 2017-11-18 RX ADMIN — OMEPRAZOLE 20 MG: 20 CAPSULE, DELAYED RELEASE ORAL at 08:21

## 2017-11-18 RX ADMIN — METOPROLOL TARTRATE 50 MG: 50 TABLET, FILM COATED ORAL at 21:17

## 2017-11-18 RX ADMIN — HEPARIN SODIUM 1400 UNITS/HR: 10000 INJECTION, SOLUTION INTRAVENOUS at 09:10

## 2017-11-18 RX ADMIN — MICONAZOLE NITRATE: 2 POWDER TOPICAL at 06:26

## 2017-11-18 RX ADMIN — ASPIRIN 325 MG: 325 TABLET, DELAYED RELEASE ORAL at 08:21

## 2017-11-18 RX ADMIN — ACETAMINOPHEN 650 MG: 325 TABLET, FILM COATED ORAL at 16:13

## 2017-11-18 RX ADMIN — METOPROLOL TARTRATE 50 MG: 50 TABLET, FILM COATED ORAL at 08:21

## 2017-11-18 ASSESSMENT — ACTIVITIES OF DAILY LIVING (ADL)
ADLS_ACUITY_SCORE: 15
ADLS_ACUITY_SCORE: 12

## 2017-11-18 ASSESSMENT — VISUAL ACUITY: OU: OTHER (SEE COMMENT)

## 2017-11-18 NOTE — PLAN OF CARE
Problem: General Plan of Care (Inpatient Behavioral)  Goal: Individualization/Patient Specific Goal (IP Behavioral)  The patient and/or their representative will achieve their patient-specific goals related to the plan of care.    The patient-specific goals include:   PT - PT orders received and chart reviewed. Pt mobilizing on her own. Her symptoms - visual- are resolving.  Nurse and pt report no need for PT  Did provide education regarding visual scanning as needed to compensate for vision.  No PT provided.

## 2017-11-18 NOTE — PROGRESS NOTES
[facility]  Hospitalist Progress Note  Favio Fortune MD 11/18/17    Reason for Stay (Diagnosis): CVA, afib         Assessment and Plan:      Summary of Stay: Es Valdez is a 67 year old female w/ hx of HTN, HLD, morbid obesity, and pre-diabetes who was admitted on 11/17/2017 with blurry L vision.  Found to have multiple R occipital lobe CVAs on brain MRI.  Also found to be in afib with RVR.  Given some IV metoprolol for rate control.  Started on heparin gtt.  Cardiology and neurology consulted.  TTE showing dilated atria, but no other significant findings.  Metoprolol po increased.  Vision improving.  Will need final decision on which oral anticoagulant to use long term.      Problem List/Assessment and Plan:   Acute to subacute suspected embolic R occipital lobe CVAs: L sided visual deficits due to multiple new R occipital lobe CVAs seen on MRI.  Given she is also in afib on admission an embolic source seems very likely.  No clot seen on TTE.  I recommend anticoagulation as does cardiology.  BMI 52 and weight is 138kg.  NOACs not as well studied for weight > 120kg, however would be a good option for patient given ease of use and preference to avoid future blood draws/monitoring.  Vision is improving some.  -neurology consulted  -on heparin gtt.  Will consider switch to eliquis vs xarelto tomorrow unless neurology feels strongly opposed to NOAC with her being a little above max weight studied then would do warfarin with lovenox bridge  -suspect we can also stop aspirin when on full anticoagulation to prevent risk of bleeding    New paroxysmal afib with RVR: in afib on admission.  Did not feel any palpitations so difficult to say how long she has been in this.  Does have biatrial enlargement on TTE as risk factor.  HR up to 140-150s and is on metoprolol pta.  Returned to NSR at 6am.    -cardiology was consulted on admit  -I increased metoprolol to 50mg bid for rate control  -recommend  "anticoagulation for chadsvasc of 5 and suspected embolic strokes as above    HTN: pta on HCTZ 25mg daily and metoprolol 25mg bid  -hold HCTZ for now given recent strokes for some permissive HTN  -metoprolol increased for rate control as above    Morbid obesity: BMI 52.5    HLD:  pta zocor 40mg daily continued along with fish oil and fenofibrate 160mg daily    Pre-diabetes: A1c 5.9 here.  Not on any medications.    DVT Prophylaxis: on heparin gtt  Code Status: Full Code  FEN: regular diet  Discharge Dispo: home  Estimated Disch Date / # of Days until Disch: likely home tomorrow        Interval History (Subjective):      Assumed care today.  Here for afib with RVR and new occipital lobe strokes.  Vision in the L eye is improved some today.  Denies weakness or paraesthesias.  No aphasia.  Headache resolved.  Long discussion regarding anticoagulation recommendations.  In NSR as of 6am.                  Physical Exam:      Last Vital Signs:  /65 (BP Location: Right arm)  Pulse 70  Temp 98.2  F (36.8  C) (Oral)  Resp 16  Ht 1.626 m (5' 4\")  Wt (!) 138.8 kg (305 lb 14.4 oz)  SpO2 92%  BMI 52.51 kg/m2      Intake/Output Summary (Last 24 hours) at 11/18/17 1548  Last data filed at 11/18/17 0600   Gross per 24 hour   Intake             73.5 ml   Output              600 ml   Net           -526.5 ml       Constitutional: Awake, NAD  Eyes: sclera white   HEENT:  MMM  Respiratory:   lungs cta bilaterally, no crackles or wheeze  Cardiovascular: RRR.  No murmur   GI: obese, non-tender, not distended, bowel sounds present  Skin: no rash   Musculoskeletal/extremities:  No edema  Neurologic: A&O, strength and light touch equal  Psychiatric: calm          Medications:      All current medications were reviewed with changes reflected in problem list.         Data:      All new lab and imaging data was reviewed.   Labs:    Recent Labs  Lab 11/18/17  0650 11/17/17  1510   WBC 10.3 11.1*   HGB 15.6 17.5*   HCT 47.3* 52.0* "   MCV 84 84    248       Recent Labs  Lab 11/18/17  0650 11/17/17  1510    138   POTASSIUM 3.5 3.7   CHLORIDE 103 102   CO2 30 30   ANIONGAP 6 6   * 103*   BUN 20 20   CR 0.75 0.73   GFRESTIMATED 77 79   GFRESTBLACK >90 >90   RAGHU 8.4* 8.9   PROTTOTAL 6.4*  --    ALBUMIN 3.0*  --    BILITOTAL 0.4  --    ALKPHOS 64  --    AST 13  --    ALT 20  --        Recent Labs  Lab 11/18/17  0650   CHOL 121   HDL 42*   LDL 53   TRIG 129    A1c 5.9    Imaging:   Recent Results (from the past 24 hour(s))   CT Head w/o Contrast    Narrative    CT SCAN OF THE HEAD WITHOUT CONTRAST   11/17/2017 3:58 PM     HISTORY: Left eye vision changes and headache, diffuse nonradiating,  came on suddenly along with vision changes.    TECHNIQUE:  Axial images of the head and coronal reformations without  IV contrast material. Radiation dose for this scan was reduced using  automated exposure control, adjustment of the mA and/or kV according  to patient size, or iterative reconstruction technique.    COMPARISON: None.    FINDINGS:  There is an arachnoid cyst in the left anterior sylvian  fissure, 3.0 x 2.4 x 6.6 cm diameter, extending superiorly along the  lateral left frontal lobe.    There is a focus of low-attenuation that appears to involve gray  matter and white matter in the medial right occipital lobe suggesting  a recent infarct. No hemorrhage. No other infarct is seen. There is  generalized atrophy of the brain.  White matter changes are present in  the cerebral hemispheres that are consistent with small vessel  ischemic disease in this age patient.     The visualized portions of the sinuses and mastoids appear normal.  There is no evidence of trauma.      Impression    IMPRESSION:   1. Probable acute infarct in the medial right occipital lobe. No  hemorrhage.  2.  Atrophy of the brain.  White matter changes consistent with  sequelae of small vessel ischemic disease.  3. Arachnoid cyst in the left sylvian fissure  extending superiorly  along the lateral left frontal lobe.     CORRINA GUILLEN MD   Head angiogram CT w & w/o contrast    Narrative    CT ANGIOGRAM OF THE HEAD AND NECK WITHOUT AND WITH CONTRAST   11/17/2017 4:03 PM     HISTORY: Left eye vision changes, nonradiating headache of sudden  onset at the same time as vision changes.    TECHNIQUE:  Precontrast localizing scans were followed by CT  angiography with an injection of 70 mL [id number]-370 IV with scans  through the head and neck.  Images were transferred to a separate 3-D  workstation where multiplanar reformations and 3-D images were  created.  Estimates of carotid stenoses are made relative to the  distal internal carotid artery diameters except as noted. Radiation  dose for this scan was reduced using automated exposure control,  adjustment of the mA and/or kV according to patient size, or iterative  reconstruction technique.     COMPARISON: None.    CT HEAD FINDINGS:  No contrast enhancing lesions.   Cerebral blood  flow is grossly normal. Arachnoid cyst in the left sylvian fissure and  lateral to left frontal lobe.    CT ANGIOGRAM HEAD FINDINGS:  Minimal calcified atherosclerotic plaque  left carotid siphon. Arteries are widely patent with no aneurysm,  significant stenosis, occlusion or intraarterial thrombus.  Venous  circulation is unremarkable.     CT ANGIOGRAM NECK FINDINGS:   Right carotid artery: Tortuous cervical right internal carotid artery.   No significant stenosis.      Left carotid artery: Tortuous cervical internal carotid artery. Mild  calcified plaque in the distal common carotid and proximal internal  carotid. No significant stenosis.      Vertebral arteries:  Dominant right vertebral artery appears widely  patent. Small left vertebral artery ends mostly in the PICA as a  normal variant. There is a small threadlike branch to the basilar  artery.  No significant stenosis.      Other findings: Atrophic right submandibular gland.      Impression     IMPRESSION:   1. Tortuous internal carotid arteries.  2. Mild calcified plaque distal left common carotid artery and  proximal left internal carotid artery and in the left carotid siphon.  3. No significant stenosis. No arterial occlusion.    CORRINA GUILLEN MD   MR Brain w/o & w Contrast    Narrative    MRI OF THE BRAIN WITHOUT AND WITH CONTRAST 11/17/2017 4:58 PM     COMPARISON: Head CT same day.    HISTORY: Left eye vision change, abnormality on MRI, evaluate for  occipital stroke.    TECHNIQUE: Axial diffusion-weighted with ADC map, axial T2-weighted  with fat saturation, axial T1-weighted, axial turboFLAIR and coronal  T1-weighted images of the brain were acquired without intravenous  contrast.  Following intravenous administration of gadolinium (15 mL  Gadavist), axial T1-weighted images of the brain were acquired.     FINDINGS: There are recent infarct scattered throughout the right  occipital lobe. The largest infarct involves the cortex at the medial  aspect of the right occipital lobe and corresponds to the hypodensity  noted in the right occipital lobe on the comparison head CT. No other  recent infarcts noted.    There is moderate diffuse cerebral volume loss. There are numerous  scattered focal areas of abnormal T2 signal hyperintensity in the  cerebral white matter bilaterally that are consistent with sequela of  chronic small vessel ischemic disease. A left middle cranial fossa  arachnoid cyst is again noted.    The ventricles and basal cisterns are within normal limits in  configuration given the degree of cerebral volume loss. There is no  midline shift. There are no extra-axial fluid collections. There is no  evidence for stroke or acute intracranial hemorrhage. There is no  abnormal contrast enhancement in the brain or its coverings.    There is no sinusitis or mastoiditis.      Impression    IMPRESSION:   1. Recent small infarcts scattered throughout the right occipital  lobe. No other recent  infarct.  2. Diffuse cerebral volume loss and cerebral white matter changes  consistent with chronic small vessel ischemic disease.    MD Favio ALVAREZ MD

## 2017-11-18 NOTE — CONSULTS
Care Transition Initial Assessment -      Met with: Patient, son-in-law  Active Problems:    Cardioembolic stroke (H)         DATA  Lives With: spouse  Living Arrangements: house  Description of Support System: Supportive, Involved  Who is your support system?: , Children  Support Assessment: Adequate family and caregiver support, Adequate social supports.              Quality Of Family Relationships: supportive, helpful, involved         ASSESSMENT  Cognitive Status:  Alert and oriented  Concerns to be addressed: Pt presents with stroke.  Is independent in room.  She lives with her .  Has supportive children nearby.  Does not anticipate any needs at this time.   PLAN  Financial costs for the patient includes None  Patient given options and choices for discharge  To return home.  Patient/family is agreeable to the plan? Yes  Patient Goals and Preferences: to return home .  Patient anticipates discharging to:  Home .

## 2017-11-18 NOTE — PLAN OF CARE
Problem: General Plan of Care (Inpatient Behavioral)  Goal: Individualization/Patient Specific Goal (IP Behavioral)  The patient and/or their representative will achieve their patient-specific goals related to the plan of care.    The patient-specific goals include:  Transfers SBA.  A/Ox4.  Tele Afib RVR 90-130s.  All other VSS.  Denies pain.  LS clear.  Denies SOB.  Pt does have a left field cut & a spot in the left visual field, & minimal HA.  Heparin 10.5ml/hr.  Pt is NPO.  Neurology, Cardiology, PT/OT, Speech & Social work to consult.  Pt had US soft tissue-no evidence of left eye retinal detachment.  Plan to have Echocardiogram today.

## 2017-11-18 NOTE — PLAN OF CARE
Problem: General Plan of Care (Inpatient Behavioral)  Goal: Individualization/Patient Specific Goal (IP Behavioral)  The patient and/or their representative will achieve their patient-specific goals related to the plan of care.    The patient-specific goals include:   Outcome: No Change  Neuro: Visual deficits to left eye - pt reports blurring only.  No darkness noted.  All other neuros WDL.  VS:  VSS  Tele: SR hr 60's  Respiratory: WDL  GI: WDL  : WDL  Skin: Reddness noted to abd fold - otherwise intact.   Pain: Denies.   IV: PIV WDL infusing heparin at 1400 units / hr  Transfer: up independently in room.   Diet: Regular. Tolerating PO foods and fluids well.   Plan: TBD.  Hospitalist, neuro following. Pt to start anticoagulant - pharmacy consulted on pt insurance coverage. Echo results pending.

## 2017-11-18 NOTE — CONSULTS
CARDIOLOGY CONSULTATION         REQUESTING PHYSICIAN:  Augusto Vines MD      REASON FOR CONSULTATION:  Atrial fibrillation with cardioembolic stroke.      CHIEF COMPLAINT:  Poor vision.      HISTORY OF PRESENT ILLNESS:  The patient Es Valdez is a pleasant 67-year-old female with a past medical history of hypertension, dyslipidemia, obesity and subclinical hypothyroidism.  She was admitted yesterday with a left eye visual deficit and headache and was diagnosed with small infarcts scattered throughout the right occipital lobe.  She was also noted to be in atrial fibrillation, and Cardiology was consulted for this.  Remarkably the patient converted to sinus rhythm last night.  She was unaware that she was in atrial fibrillation and denied palpitations or chest discomfort.        The patient had a negative exercise stress echocardiogram in 2009.  She had a CT angiogram of the head that demonstrated mild calcified distal left common carotid and proximal left internal carotid plaque.  She does not do any exercise with activities of daily living.  She does not endorse shortness of breath.  She denies alcohol intake or tobacco exposure.  Her  denies that the patient snores, and she does not have any features of daytime somnolence.  She tells me her vision has improved, although she still sees colored spots on the left side of her visual field.      REVIEW OF SYSTEMS:  A complete review was performed and is negative except as above in the History of Present Illness.      PAST MEDICAL HISTORY:     1.  Hypertension.   2.  Dyslipidemia.   3.  Obesity.   4.  Borderline diabetes.      SOCIAL HISTORY:  The patient lives in [facility] with her .  She denies tobacco exposure or use of alcohol.      ALLERGIES:  Seasonal allergies.      FAMILY HISTORY:  Mother had a myocardial infarction in her early 50s.      PHYSICAL EXAMINATION:   VITAL SIGNS:  Blood pressure 123/60, heart rate 64 and currently regular,  converted overnight from atrial fibrillation to sinus rhythm, respiratory rate 16, O2 saturations 91% on room air.   GENERAL:  The patient appears pleasant and comfortable.   HEENT:  No pallor or icterus.   NECK:  Normal jugular venous distension, no bruit.   CARDIOVASCULAR:  S1, S2 normal, no murmur, rub or gallop.   RESPIRATORY:  Lungs clear to auscultation bilaterally.   ABDOMEN:  Soft, obese and nontender.   EXTREMITIES:  No pitting pedal edema.   NEUROLOGIC:  Alert and oriented x3.   PSYCHIATRIC:  Normal affect.   SKIN:  No obvious concerns.      LABORATORY AND IMAGING RESULTS:    1.  EKG obtained at the time of admission was reviewed by me and demonstrates atrial fibrillation with rapid ventricular rate.     2.  EKG done this morning demonstrates sinus rhythm.   3.  TSH 4.14.     4.  T4 normal at 1.28.     5.  Normal basic metabolic profile.   6.  CBC shows hemoglobin of 15.6, platelets 229,000.     7.  CT angiogram as above showing occipital infarct.   8.  MRI as above showing occipital infarct.   9.  Echocardiogram pending.      IMPRESSION/MEDICAL DECISION-MAKING:  The patient is a pleasant 67-year-old female with newly-diagnosed paroxysmal atrial fibrillation and an occipital stroke with a [id number]-DXMk score of 5 (age, gender, hypertension, stroke).  Remarkably she is asymptomatic from an episode of paroxysmal atrial fibrillation and is now in sinus rhythm.        I had a long discussion with the patient regarding the risk of stroke with atrial fibrillation and ways to prevent future stroke.  Given her elevated GUT1RV1-WEOq score p.o. anticoagulation is recommended.  She is currently on a heparin drip, and Neurology has yet to see the patient.  I briefly discussed with the patient the option of Coumadin versus a new anticoagulant agent.  It is reasonable to use either one of the oral anticoagulation agents, Coumadin versus new oral anticoagulant/NOAC such as apixaban if it is okayed by Neurology.  If NOAC  is okayed by Neurology apixaban 5 mg b.i.d. is a reasonable choice.        A transthoracic echocardiogram is pending at this time, and I will follow the results.  At this time given that the patient is essentially asymptomatic from episode of paroxysmal atrial fibrillation and is in sinus rhythm I will simply recommend continuing beta blocker therapy which has been initiated.  It may be reasonable to consider an outpatient sleep study.      ASSESSMENT:    1.  Newly-diagnosed paroxysmal atrial fibrillation with [id number] score of 5.   2.  Occipital infarct, likely related to #1.   3.  Hypertension.   4.  Obesity.        RECOMMENDATIONS:      1.  Long-term oral anticoagulation is recommended.     2.  I am okay with using a new oral anticoagulant/NOAC.   3.  If okayed by Neurology apixaban 5 mg b.i.d. is a reasonable choice.   3.  Transthoracic echocardiogram pending.  I will follow the results.   4.  Consider outpatient sleep study.      Thank you for involving me in the care of Ms. Valdez.         JAYLYN CROUCH MD             D: 2017 10:36   T: 2017 12:59   MT: [id number]      Name:     YAQUELIN VALDEZ   MRN:      [id number]        Account:       [id number]   :      1950           Consult Date:  2017      Document: [id number]       cc: [facility]

## 2017-11-18 NOTE — PROGRESS NOTES
Clinical Swallow Evaluation  [facility]  11/18/17 0826   General Information   Onset Date 11/17/17   Start of Care Date 11/18/17   Referring Physician Dr. Vines   Patient Profile Review/OT: Additional Occupational Profile Info See Profile for full history and prior level of function   Patient/Family Goals Statement Not stated   Swallowing Evaluation Bedside swallow evaluation   Behaviorial Observations WNL (within normal limits)   Mode of current nutrition NPO   Comments R infarcts in occipital lobe; left visual field cut  Past Medical History:   Diagnosis Date     Hypertension       Clinical Swallow Evaluation   Oral Musculature generally intact   Structural Abnormalities none present   Dentition present and adequate   Mucosal Quality good   Mandibular Strength and Mobility intact   Oral Labial Strength and Mobility WFL   Lingual Strength and Mobility WFL   Velar Elevation intact   Buccal Strength and Mobility intact   Laryngeal Function Cough;Throat clear;Swallow;Voicing initiated;Dry swallow palpated   Additional Documentation Yes   Clinical Swallow Eval: Thin Liquid Texture Trial   Mode of Presentation, Thin Liquids cup;straw;self-fed   Volume of Liquid or Food Presented 4 ounces water   Oral Phase of Swallow WFL   Pharyngeal Phase of Swallow intact   Diagnostic Statement WNL   Clinical Swallow Eval: Puree Solid Texture Trial   Mode of Presentation, Puree cup;self-fed   Volume of Puree Presented 2 ounces applesauce   Oral Phase, Puree WFL   Pharyngeal Phase, Puree intact   Diagnostic Statement WNL   Clinical Swallow Eval: Solid Food Texture Trial   Mode of Presentation, Solid self-fed   Volume of Solid Food Presented Bet[place] cracker   Oral Phase, Solid WFL   Pharyngeal Phase, Solid intact   Diagnostic Statement WNL   Swallow Compensations   Swallow Compensations No compensations were used   Results No difficulties noted   Swallow Eval: Clinical Impressions   Skilled Criteria for Therapy Intervention No problems  identified which require skilled intervention   Functional Assessment Scale (FAS) 7   Dysphagia Outcome Severity Scale (FABRIZIO) Level 7 - FABRIZIO   Diet texture recommendations Regular diet;Thin liquids   Demonstrates Need for Referral to Another Service occupational therapy;physical therapy   Anticipated Discharge Disposition home   Risks and Benefits of Treatment have been explained. Yes   Patient, family and/or staff in agreement with Plan of Care Yes   Clinical Impression Comments Patient presents with a normal swallow function on Regular textures and thin liquids. No oral-motor weakness and good oral clearing. Prompt swallow response and no overt s/sx of aspiration. No c/o food sticking. Speech is WNL. Other than visual changes, patient does not feel there are any other changes or deficits.   Total Evaluation Time   Total Evaluation Time (Minutes) 20   Marcos Mclean MS CCC-SLP

## 2017-11-18 NOTE — PROGRESS NOTES
"An EKG was completed on this pt, with no complications noted during the procedure.    Vital signs:  Temp: 96.7  F (35.9  C) Temp src: Oral BP: 123/60 Pulse: 70 Heart Rate: 64 Resp: 16 SpO2: 91 % O2 Device: None (Room air)   Height: 162.6 cm (5' 4\") Weight: (!) 138.8 kg (305 lb 14.4 oz)  Estimated body mass index is 52.51 kg/(m^2) as calculated from the following:    Height as of this encounter: 1.626 m (5' 4\").    Weight as of this encounter: 138.8 kg (305 lb 14.4 oz).    Sammy Nicholson RT  11/18/2017    "

## 2017-11-18 NOTE — ED NOTES
Pt had a steady gait with use of cane and shoes to and from bathroom.  Meal tray ordered.  No aphasia or swallowing difficulties at this time.  ABCs intact.  Call light within reach. Pts  went home.

## 2017-11-18 NOTE — PROGRESS NOTES
Low normal LVEF, MAC, no significant valvular regurgitation or stenosis.  PAF with occipital stroke- [id number] score 5    Recommendations  Will need long term oral anticoagulation- choice defer to neurology/inpatient medicine team-I am ok with a NOAC like apixaban 5 mg bid, agree with continuing metoprolol, consider o/p sleep study.    Cardiology will sign off. Please call with questions.

## 2017-11-18 NOTE — PHARMACY-ADMISSION MEDICATION HISTORY
Admission medication history interview status for this patient is complete. See [facility] admission navigator for allergy information, prior to admission medications and immunization status.     Medication history interview source(s):Patient  Medication history resources (including written lists, pill bottles, clinic record):Pill bottles      Changes made to PTA medication list:  Added: none  Deleted: none  Changed: none    Actions taken by pharmacist (provider contacted, etc):None     Additional medication history information:None    Medication reconciliation/reorder completed by provider prior to medication history? No    Do you take OTC medications (eg tylenol, ibuprofen, fish oil, eye/ear drops, etc)? Y(Y/N)    For patients on insulin therapy: N (Y/N)    Time spent in this activity: 15 min    Prior to Admission medications    Medication Sig Last Dose Taking? Auth Provider   SIMVASTATIN PO Take 40 mg by mouth At Bedtime  11/16/2017 at Unknown time Yes Reported, Patient   ASPIRIN PO Take 325 mg by mouth  11/16/2017 at Unknown time Yes Reported, Patient   Omega-3 Fatty Acids (FISH OIL PO) Take 1 capsule by mouth 2 times daily  11/16/2017 at Unknown time Yes Reported, Patient   glucosamine 500 MG CAPS Take 1 tablet by mouth 2 times daily  11/16/2017 at Unknown time Yes Reported, Patient   OMEPRAZOLE PO Take 20 mg by mouth  11/16/2017 at Unknown time Yes Reported, Patient   fenofibrate 160 MG tablet Take 160 mg by mouth daily  11/16/2017 at Unknown time Yes Reported, Patient   HYDROCHLOROTHIAZIDE PO Take 25 mg by mouth daily  11/16/2017 at Unknown time Yes Reported, Patient   METOPROLOL TARTRATE PO Take 25 mg by mouth 2 times daily  11/16/2017 at Unknown time Yes Reported, Patient   Cetirizine HCl (ZYRTEC ALLERGY PO) Take 10 mg by mouth  11/16/2017 at Unknown time Yes Reported, Patient

## 2017-11-18 NOTE — PLAN OF CARE
Discharge Planner SLP   Patient plan for discharge: Return home  Current status: Clinical Swallow Evaluation completed. Please see report for details. Swallow is WNL and recommend a Regular diet with thin liquids. Diet ordered in EPIC.  Barriers to return to prior living situation: None identified  Recommendations for discharge: No further SLP involvement  Rationale for recommendations: Patient is at her baseline diet and swallow function       Entered by: Marcos Ruano 11/18/2017 8:32 AM

## 2017-11-18 NOTE — H&P
[facility], [place]    History and Physical  Hospitalist     Date of Admission:  11/17/2017  Date of Service (when I saw the patient): 11/17/17  Provider: Augusto Vines MD      Chief Complaint    Vision deficit and headache.     History is obtained from the patient, electronic health record and emergency department physician    History of Present Illness   Es Valdez is a 67 year old female with past medical history of hypertension, dyslipidemia, obesity  who presents with left eye visual deficit and headache.  Her symptoms started on Wednesday afternoon.  She decided to come to the hospital today for an assessment.  She thought she was having a migraine crisis but the persistency of the symptoms prompted her to look for a diagnosis.  She denies any weakness, sensitivity deficit or other neurological symptoms.  She is not able see objects in the peripheral field of the left eye, she described also bright floaters in front of the eye.  Her headache has been mainly right-sided.  No nausea or vomiting.  No prior symptoms.  She has a remote history of migraine, no flareup in more than four years.  Lab workup tonight has documented scattered infarctions throughout the right occipital lobe.  Patient arrived in atrial fibrillation with RVR though she was not aware of that.  She has regular medical care, she takes metoprolol and hydrochlorothiazide for blood pressure control.  She has hypercholesterolemia and hypertriglyceridemia, she is taking simvastatin and fenofibrate.  She takes aspirin daily.    Past Medical History    I have reviewed this patient's medical history and updated it with pertinent information if needed.   Past Medical History:   Diagnosis Date     Hypertension        Assessment & Plan   Es Valdez is a 67 year old female who presents with left eye visual deficit and headache of new onset. Symptoms have been present since last Wednesday. Arrived in AF with RVR.     1. Recent small  infarcts scattered throughout the right occipital  Lobe.  -Onset on Wednesday afternoon, presence of A. Fib and scattered Infarctions are very suggestive of cardioembolic stroke.  2. New onset of AF with RVR.   3.  Dyslipidemia.  4.  Morbid obesity.  5.  ? Borderline diabetes mellitus per patient report.    6.  Essential hypertension.  7. Subclinical hypothyroidism.    Plan  - Inpatient   - Telemetry  - Heparin drip   - Echocardiogram, cardiology consultation to consider BLANCA.  -Continue fenofibrate and simvastatin.  -Continue hydrochlorothiazide PTA, increased dose of metoprolol from 25 to 50 b.i.d.  -Aspirin 325 daily  -Neurology consultation.    Code Status   Full Code    Primary Care Physician   [facility]      Past Surgical History   I have reviewed this patient's surgical history and updated it with pertinent information if needed.  Past Surgical History:   Procedure Laterality Date     CHOLECYSTECTOMY       ORTHOPEDIC SURGERY         Prior to Admission Medications   Prior to Admission Medications   Prescriptions Last Dose Informant Patient Reported? Taking?   ASPIRIN PO   Yes Yes   Cetirizine HCl (ZYRTEC ALLERGY PO)   Yes Yes   HYDROCHLOROTHIAZIDE PO   Yes Yes   METOPROLOL TARTRATE PO   Yes Yes   OMEPRAZOLE PO   Yes Yes   Omega-3 Fatty Acids (FISH OIL PO)   Yes Yes   SIMVASTATIN PO   Yes Yes   fenofibrate 160 MG tablet   Yes Yes   Sig: Take by mouth daily   glucosamine 500 MG CAPS   Yes Yes      Facility-Administered Medications: None     Allergies   Allergies   Allergen Reactions     Seasonal Allergies        Social History   I have personally reviewed the social history with the patient showing long life non smoker, she is not a drinker.    Family History   Hypertension, hyperlipidemia, diabetes'    Review of Systems   Ten points ROS done and pertinent as per HPI, otherwise negative.    Physical Exam   Vital Signs with Ranges  Temp:  [97.2  F (36.2  C)] 97.2  F (36.2  C)  Pulse:   [81-88] 88  Resp:  [18] 18  BP: ()/(80-98) 98/88  SpO2:  [89 %-98 %] 97 %  300 lbs 0 oz    GEN:  Alert, oriented x 3, appears comfortable, NAD.  HEENT:  Normocephalic/atraumatic, no scleral icterus, no nasal discharge, mouth moist.  CV:  Regular rate and rhythm, no murmur or JVD.  S1 + S2 noted, no S3 or S4.  LUNGS:  Clear to auscultation bilaterally without rales/rhonchi/wheezing/retractions.  Symmetric chest rise on inhalation noted.  ABD:  Active bowel sounds, soft, non-tender/non-distended.  No rebound/guarding/rigidity.  EXT:  No edema or cyanosis.  No joint synovitis noted.  SKIN:  Dry to touch, no exanthems noted in the visualized areas.  NEURO: Alert, no focal motor deficit or sensitivity deficit.  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    Data   I personally reviewed the EKG tracing showing AF with RVR.  Results for orders placed or performed during the hospital encounter of 11/17/17 (from the past 24 hour(s))   CBC with platelets differential   Result Value Ref Range    WBC 11.1 (H) 4.0 - 11.0 10e9/L    RBC Count 6.23 (H) 3.8 - 5.2 10e12/L    Hemoglobin 17.5 (H) 11.7 - 15.7 g/dL    Hematocrit 52.0 (H) 35.0 - 47.0 %    MCV 84 78 - 100 fl    MCH 28.1 26.5 - 33.0 pg    MCHC 33.7 31.5 - 36.5 g/dL    RDW 12.1 10.0 - 15.0 %    Platelet Count 248 150 - 450 10e9/L    Diff Method Automated Method     % Neutrophils 60.0 %    % Lymphocytes 33.8 %    % Monocytes 4.8 %    % Eosinophils 0.8 %    % Basophils 0.2 %    % Immature Granulocytes 0.4 %    Nucleated RBCs 0 0 /100    Absolute Neutrophil 6.7 1.6 - 8.3 10e9/L    Absolute Lymphocytes 3.8 0.8 - 5.3 10e9/L    Absolute Monocytes 0.5 0.0 - 1.3 10e9/L    Absolute Eosinophils 0.1 0.0 - 0.7 10e9/L    Absolute Basophils 0.0 0.0 - 0.2 10e9/L    Abs Immature Granulocytes 0.0 0 - 0.4 10e9/L    Absolute Nucleated RBC 0.0    Basic metabolic panel   Result Value Ref Range    Sodium 138 133 - 144 mmol/L    Potassium 3.7 3.4 - 5.3 mmol/L    Chloride 102  94 - 109 mmol/L    Carbon Dioxide 30 20 - 32 mmol/L    Anion Gap 6 3 - 14 mmol/L    Glucose 103 (H) 70 - 99 mg/dL    Urea Nitrogen 20 7 - 30 mg/dL    Creatinine 0.73 0.52 - 1.04 mg/dL    GFR Estimate 79 >60 mL/min/1.7m2    GFR Estimate If Black >90 >60 mL/min/1.7m2    Calcium 8.9 8.5 - 10.1 mg/dL   Erythrocyte sedimentation rate auto   Result Value Ref Range    Sed Rate 5 0 - 30 mm/h   CRP inflammation   Result Value Ref Range    CRP Inflammation <2.9 0.0 - 8.0 mg/L   Hemoglobin A1c   Result Value Ref Range    Hemoglobin A1C 5.9 4.3 - 6.0 %   TSH with free T4 reflex   Result Value Ref Range    TSH 4.14 (H) 0.40 - 4.00 mU/L   T4 free   Result Value Ref Range    T4 Free 1.28 0.76 - 1.46 ng/dL   CT Head w/o Contrast    Narrative    CT SCAN OF THE HEAD WITHOUT CONTRAST   11/17/2017 3:58 PM     HISTORY: Left eye vision changes and headache, diffuse nonradiating,  came on suddenly along with vision changes.    TECHNIQUE:  Axial images of the head and coronal reformations without  IV contrast material. Radiation dose for this scan was reduced using  automated exposure control, adjustment of the mA and/or kV according  to patient size, or iterative reconstruction technique.    COMPARISON: None.    FINDINGS:  There is an arachnoid cyst in the left anterior sylvian  fissure, 3.0 x 2.4 x 6.6 cm diameter, extending superiorly along the  lateral left frontal lobe.    There is a focus of low-attenuation that appears to involve gray  matter and white matter in the medial right occipital lobe suggesting  a recent infarct. No hemorrhage. No other infarct is seen. There is  generalized atrophy of the brain.  White matter changes are present in  the cerebral hemispheres that are consistent with small vessel  ischemic disease in this age patient.     The visualized portions of the sinuses and mastoids appear normal.  There is no evidence of trauma.      Impression    IMPRESSION:   1. Probable acute infarct in the medial right occipital  lobe. No  hemorrhage.  2.  Atrophy of the brain.  White matter changes consistent with  sequelae of small vessel ischemic disease.  3. Arachnoid cyst in the left sylvian fissure extending superiorly  along the lateral left frontal lobe.     CORRINA GUILLEN MD   Head angiogram CT w & w/o contrast    Narrative    CT ANGIOGRAM OF THE HEAD AND NECK WITHOUT AND WITH CONTRAST   11/17/2017 4:03 PM     HISTORY: Left eye vision changes, nonradiating headache of sudden  onset at the same time as vision changes.    TECHNIQUE:  Precontrast localizing scans were followed by CT  angiography with an injection of 70 mL [id number]-370 IV with scans  through the head and neck.  Images were transferred to a separate 3-D  workstation where multiplanar reformations and 3-D images were  created.  Estimates of carotid stenoses are made relative to the  distal internal carotid artery diameters except as noted. Radiation  dose for this scan was reduced using automated exposure control,  adjustment of the mA and/or kV according to patient size, or iterative  reconstruction technique.     COMPARISON: None.    CT HEAD FINDINGS:  No contrast enhancing lesions.   Cerebral blood  flow is grossly normal. Arachnoid cyst in the left sylvian fissure and  lateral to left frontal lobe.    CT ANGIOGRAM HEAD FINDINGS:  Minimal calcified atherosclerotic plaque  left carotid siphon. Arteries are widely patent with no aneurysm,  significant stenosis, occlusion or intraarterial thrombus.  Venous  circulation is unremarkable.     CT ANGIOGRAM NECK FINDINGS:   Right carotid artery: Tortuous cervical right internal carotid artery.   No significant stenosis.      Left carotid artery: Tortuous cervical internal carotid artery. Mild  calcified plaque in the distal common carotid and proximal internal  carotid. No significant stenosis.      Vertebral arteries:  Dominant right vertebral artery appears widely  patent. Small left vertebral artery ends mostly in the PICA as  a  normal variant. There is a small threadlike branch to the basilar  artery.  No significant stenosis.      Other findings: Atrophic right submandibular gland.      Impression    IMPRESSION:   1. Tortuous internal carotid arteries.  2. Mild calcified plaque distal left common carotid artery and  proximal left internal carotid artery and in the left carotid siphon.  3. No significant stenosis. No arterial occlusion.   MR Brain w/o & w Contrast    Narrative    MRI OF THE BRAIN WITHOUT AND WITH CONTRAST 11/17/2017 4:58 PM     COMPARISON: Head CT same day.    HISTORY: Left eye vision change, abnormality on MRI, evaluate for  occipital stroke.    TECHNIQUE: Axial diffusion-weighted with ADC map, axial T2-weighted  with fat saturation, axial T1-weighted, axial turboFLAIR and coronal  T1-weighted images of the brain were acquired without intravenous  contrast.  Following intravenous administration of gadolinium (15 mL  Gadavist), axial T1-weighted images of the brain were acquired.     FINDINGS: There are recent infarct scattered throughout the right  occipital lobe. The largest infarct involves the cortex at the medial  aspect of the right occipital lobe and corresponds to the hypodensity  noted in the right occipital lobe on the comparison head CT. No other  recent infarcts noted.    There is moderate diffuse cerebral volume loss. There are numerous  scattered focal areas of abnormal T2 signal hyperintensity in the  cerebral white matter bilaterally that are consistent with sequela of  chronic small vessel ischemic disease. A left middle cranial fossa  arachnoid cyst is again noted.    The ventricles and basal cisterns are within normal limits in  configuration given the degree of cerebral volume loss. There is no  midline shift. There are no extra-axial fluid collections. There is no  evidence for stroke or acute intracranial hemorrhage. There is no  abnormal contrast enhancement in the brain or its coverings.    There  is no sinusitis or mastoiditis.      Impression    IMPRESSION:   1. Recent small infarcts scattered throughout the right occipital  lobe. No other recent infarct.  2. Diffuse cerebral volume loss and cerebral white matter changes  consistent with chronic small vessel ischemic disease.   EKG 12 lead   Result Value Ref Range    Interpretation ECG Click View Image link to view waveform and result          Disclaimer: This note consists of symbols derived from keyboarding, dictation and/or voice recognition software. As a result, there may be errors in the script that have gone undetected. Please consider this when interpreting information found in this chart.

## 2017-11-18 NOTE — ED NOTES
"Pt ambulated to and from bathroom stating, \"I think I need to go right, now!\".  After coming out of restroom pt states, \"It was just a lot of gas pains, no stool.  Sometimes I get that after I eat\".  Pt had a steady gait and used cane appropriately.  Heparin started and bedside commode placed in room. Daughter and call light at bedside.  "

## 2017-11-18 NOTE — PLAN OF CARE
Problem: Stroke (Ischemic) (Adult)  Goal: Signs and Symptoms of Listed Potential Problems Will be Absent, Minimized or Managed (Stroke)  Signs and symptoms of listed potential problems will be absent, minimized or managed by discharge/transition of care (reference Stroke (Ischemic) (Adult) CPG).   A&Ox4, Left visual problem, up with SBA, Tele Afib RVR, on oral  Metoprolol, Heparin gtt stopped r/t loss of IV access, will have flyer insert one, denies pain, will continue with POC.

## 2017-11-19 ENCOUNTER — APPOINTMENT (OUTPATIENT)
Dept: OCCUPATIONAL THERAPY | Facility: CLINIC | Age: 67
DRG: 065 | End: 2017-11-19
Attending: HOSPITALIST
Payer: MEDICARE

## 2017-11-19 VITALS
RESPIRATION RATE: 18 BRPM | DIASTOLIC BLOOD PRESSURE: 77 MMHG | TEMPERATURE: 98.5 F | WEIGHT: 293 LBS | BODY MASS INDEX: 50.02 KG/M2 | HEART RATE: 70 BPM | OXYGEN SATURATION: 94 % | SYSTOLIC BLOOD PRESSURE: 146 MMHG | HEIGHT: 64 IN

## 2017-11-19 LAB
INTERPRETATION ECG - MUSE: NORMAL
LMWH PPP CHRO-ACNC: 0.4 IU/ML

## 2017-11-19 PROCEDURE — 97165 OT EVAL LOW COMPLEX 30 MIN: CPT | Mod: GO | Performed by: OCCUPATIONAL THERAPIST

## 2017-11-19 PROCEDURE — 25000125 ZZHC RX 250: Performed by: INTERNAL MEDICINE

## 2017-11-19 PROCEDURE — 25000132 ZZH RX MED GY IP 250 OP 250 PS 637: Mod: GY | Performed by: HOSPITALIST

## 2017-11-19 PROCEDURE — 97535 SELF CARE MNGMENT TRAINING: CPT | Mod: GO | Performed by: OCCUPATIONAL THERAPIST

## 2017-11-19 PROCEDURE — 40000133 ZZH STATISTIC OT WARD VISIT: Performed by: OCCUPATIONAL THERAPIST

## 2017-11-19 PROCEDURE — 90662 IIV NO PRSV INCREASED AG IM: CPT | Performed by: HOSPITALIST

## 2017-11-19 PROCEDURE — 25000128 H RX IP 250 OP 636: Performed by: HOSPITALIST

## 2017-11-19 PROCEDURE — 99239 HOSP IP/OBS DSCHRG MGMT >30: CPT | Performed by: INTERNAL MEDICINE

## 2017-11-19 PROCEDURE — A9270 NON-COVERED ITEM OR SERVICE: HCPCS | Mod: GY | Performed by: HOSPITALIST

## 2017-11-19 PROCEDURE — 85520 HEPARIN ASSAY: CPT | Performed by: EMERGENCY MEDICINE

## 2017-11-19 PROCEDURE — 36415 COLL VENOUS BLD VENIPUNCTURE: CPT | Performed by: EMERGENCY MEDICINE

## 2017-11-19 RX ORDER — METOPROLOL TARTRATE 50 MG
50 TABLET ORAL 2 TIMES DAILY
Qty: 60 TABLET | Refills: 1 | Status: SHIPPED | OUTPATIENT
Start: 2017-11-19

## 2017-11-19 RX ADMIN — RIVAROXABAN 20 MG: 20 TABLET, FILM COATED ORAL at 10:32

## 2017-11-19 RX ADMIN — OMEPRAZOLE 20 MG: 20 CAPSULE, DELAYED RELEASE ORAL at 09:18

## 2017-11-19 RX ADMIN — METOPROLOL TARTRATE 50 MG: 50 TABLET, FILM COATED ORAL at 09:18

## 2017-11-19 RX ADMIN — FENOFIBRATE 160 MG: 160 TABLET ORAL at 09:18

## 2017-11-19 RX ADMIN — INFLUENZA A VIRUSA/MICHIGAN/45/2015 X-275 (H1N1) ANTIGEN (FORMALDEHYDE INACTIVATED), INFLUENZA A VIRUS A/HONG KONG/4801/2014 X-263B (H3N2) ANTIGEN (FORMALDEHYDE INACTIVATED), AND INFLUENZA B VIRUS B/BRISBANE/60/2008 ANTIGEN (FORMALDEHYDE INACTIVATED) 0.5 ML: 60; 60; 60 INJECTION, SUSPENSION INTRAMUSCULAR at 09:50

## 2017-11-19 RX ADMIN — ASPIRIN 325 MG: 325 TABLET, DELAYED RELEASE ORAL at 09:18

## 2017-11-19 ASSESSMENT — ACTIVITIES OF DAILY LIVING (ADL)
ADLS_ACUITY_SCORE: 15

## 2017-11-19 NOTE — PLAN OF CARE
"Problem: Stroke (Ischemic) (Adult)  Goal: Signs and Symptoms of Listed Potential Problems Will be Absent, Minimized or Managed (Stroke)  Signs and symptoms of listed potential problems will be absent, minimized or managed by discharge/transition of care (reference Stroke (Ischemic) (Adult) CPG).   A&O, up independently, VSS, prn Tylenol for HA, still c/o visual deficits of left visual field. Pt describes it as \"fast moving colorful  screen saver\". Continues on Heparin gtt@14ml/hr, Neuro following, Tele SR, will continue with POC.      "

## 2017-11-19 NOTE — PLAN OF CARE
Problem: General Plan of Care (Inpatient Behavioral)  Goal: Individualization/Patient Specific Goal (IP Behavioral)  The patient and/or their representative will achieve their patient-specific goals related to the plan of care.    The patient-specific goals include:   Transfers SBA.  A/Ox4.  Tele SR. VSS.  Denies pain.  LS clear.  Denies SOB.  Pt does have left vision field deficit.  Heparin 14ml/hr  Neurology consulted & recommend pt start on Xarelto & will need outpt OT tx for visual rehabilitation.  Also might have some driving restrictions & might need to a driving assessment.  Pt will also need an outpt sleep study.

## 2017-11-19 NOTE — PLAN OF CARE
Problem: Patient Care Overview  Goal: Plan of Care/Patient Progress Review  OT order received and one time eval and treat completed.  Pt. Lives in home with spouse who is able to assist and able to drive pt. And setup home for safety as well as drive to necessary OP appointments.    Discharge Planner OT   Patient plan for discharge: home with OP OT  Current status: pt. Independent with ADLs and able to scan for most IADLs.  Pt. Unable to drive at this time as presents with left lower quadrant field cut and blurriness impairing safety.  Barriers to return to prior living situation: visual field cut for driving  Recommendations for discharge: home with OP OT  Rationale for recommendations: Pt. Motivated and independent with visual scanning for ADLs and most IADLs and has supportive family at home.  IP OT educated on visual scanning and modifications at home as well as services for OP OT and driving assessment.    Occupational Therapy Discharge Summary    Reason for therapy discharge:    Discharged to home with outpatient therapy.  All goals and outcomes met, no further needs identified.    Progress towards therapy goal(s). See goals on Care Plan in [facility] electronic health record for goal details.  Goals met    Therapy recommendation(s):    Continued therapy is recommended.  Rationale/Recommendations:  Pt. presents with L visual cut that impairs independence with IADLs and would benefit from direct intervention from OP OT services..           Entered by: Doris Spain 11/19/2017 1:02 PM

## 2017-11-19 NOTE — DISCHARGE SUMMARY
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  Found to have multiple R occipital lobe CVAs on brain MRI.  Also found to be in afib with RVR.  Given some IV metoprolol for rate control.  Started on heparin gtt.  Cardiology and neurology consulted.  TTE showing dilated atria, but no other significant findings.  Metoprolol po increased and remains in NSR for over 24 hours.  Vision improving.  Started on Xarelto after evaluation by cardiology and neurology.  home aspirin stopped.  D/c home with f/u with pcp.      Problem List/Assessment and Plan:   Acute to subacute suspected embolic R occipital lobe CVAs: L sided visual deficits due to multiple new R occipital lobe CVAs seen on MRI.  Given she is also in afib on admission an embolic source seems very likely.  No clot seen on TTE.  I recommend anticoagulation as does cardiology.  BMI 52 and weight is 138kg.  NOACs not as well studied for weight > 120kg, however would be a good option for patient given ease of use and preference to avoid future blood draws/monitoring.  Vision is improving some.  Neurology consulted.  Recommended indefinite anticoagulation for embolic CVA and afib.  Both cardiology and neurology ok with NOAC despite her being a little above max weight studied, although review of Xarelto would suggest effectiveness.  Patient ok with Xarelto.   Stopped pta aspirin while on full anticoagulation to prevent risk of bleeding.  F/u with pcp within the week.  Instructed not to drive given L vision deficits until improved and given ok.  Outpatient OT visual rehab ordered.     New paroxysmal afib with RVR: in afib on admission.  Did not feel any palpitations so difficult to say how long she has been in this.  Does have biatrial enlargement on TTE as risk factor.  HR up to 140-150s and is on metoprolol pta.  Returned to NSR for over 24 hours on increased dose of metoprolol 50mg bid.  Recommend anticoagulation for chadsvasc of 5 and suspected embolic strokes as above.  Started Xarelto.     HTN: pta on HCTZ 25mg daily and metoprolol 25mg bid.  Metoprolol increased to 50mg bid for rate control and BP tolerating this.     Morbid obesity: BMI 52.5    HLD:  pta zocor 40mg daily continued along with fish oil and fenofibrate 160mg daily     Pre-diabetes: A1c 5.9 here.  Not on any medications.     Discharge Disposition:  Discharged to home     Allergies:  Allergies   Allergen Reactions     Seasonal Allergies         Discharge Medications:   Current Discharge Medication List      START taking these medications    Details   rivaroxaban ANTICOAGULANT (XARELTO) 20 MG TABS tablet Take 1 tablet (20 mg) by mouth daily (with dinner)  Qty: 30 tablet, Refills: 1    Associated Diagnoses: Occipital stroke (H); Paroxysmal a-fib (H)         CONTINUE these medications which have CHANGED    Details   metoprolol (LOPRESSOR) 50 MG tablet Take 1 tablet (50 mg) by mouth 2 times daily  Qty: 60 tablet, Refills: 1    Associated Diagnoses: Paroxysmal a-fib (H)         CONTINUE these medications which have NOT CHANGED    Details   SIMVASTATIN PO Take 40 mg by mouth At Bedtime       Omega-3 Fatty Acids (FISH OIL PO) Take 1 capsule by mouth 2 times daily       glucosamine 500 MG CAPS Take 1 tablet by mouth 2 times daily       OMEPRAZOLE PO Take 20 mg by mouth       fenofibrate 160 MG tablet Take 160  "mg by mouth daily       HYDROCHLOROTHIAZIDE PO Take 25 mg by mouth daily       Cetirizine HCl (ZYRTEC ALLERGY PO) Take 10 mg by mouth          STOP taking these medications       ASPIRIN PO Comments:   Reason for Stopping:                Condition on Discharge:  Discharge condition: Stable   Discharge vitals: Blood pressure 146/77, pulse 70, temperature 98.5  F (36.9  C), temperature source Oral, resp. rate 18, height 1.626 m (5' 4\"), weight (!) 138.8 kg (305 lb 14.4 oz), SpO2 94 %.   Code status on discharge: Full Code     History of Illness:  See detailed admission note for full details.    Physical Exam:  Blood pressure 146/77, pulse 70, temperature 98.5  F (36.9  C), temperature source Oral, resp. rate 18, height 1.626 m (5' 4\"), weight (!) 138.8 kg (305 lb 14.4 oz), SpO2 94 %.  Wt Readings from Last 1 Encounters:   11/17/17 (!) 138.8 kg (305 lb 14.4 oz)     Constitutional: Awake, NAD  Eyes: sclera white, PERRL, EOM intact  HEENT: atraumatic, MMM  Respiratory: no respiratory distress, lungs cta bilaterally, no crackles or wheeze  Cardiovascular: RRR.  No murmur  GI: obese, non-tender, not distended, bowel sounds present  Skin: no rash or lesions, acyanotic  Musculoskeletal/extremities: atraumatic, no major deformities. No edema  Neurologic: A&O, speech clear, strength and light touch sensation grossly normal  Psychiatric: calm, cooperative, normal affect    Procedures other than Imaging:  None     Imaging:  Results for orders placed or performed during the hospital encounter of 11/17/17   CT Head w/o Contrast    Narrative    CT SCAN OF THE HEAD WITHOUT CONTRAST   11/17/2017 3:58 PM     HISTORY: Left eye vision changes and headache, diffuse nonradiating,  came on suddenly along with vision changes.    TECHNIQUE:  Axial images of the head and coronal reformations without  IV contrast material. Radiation dose for this scan was reduced using  automated exposure control, adjustment of the mA and/or kV according  to " patient size, or iterative reconstruction technique.    COMPARISON: None.    FINDINGS:  There is an arachnoid cyst in the left anterior sylvian  fissure, 3.0 x 2.4 x 6.6 cm diameter, extending superiorly along the  lateral left frontal lobe.    There is a focus of low-attenuation that appears to involve gray  matter and white matter in the medial right occipital lobe suggesting  a recent infarct. No hemorrhage. No other infarct is seen. There is  generalized atrophy of the brain.  White matter changes are present in  the cerebral hemispheres that are consistent with small vessel  ischemic disease in this age patient.     The visualized portions of the sinuses and mastoids appear normal.  There is no evidence of trauma.      Impression    IMPRESSION:   1. Probable acute infarct in the medial right occipital lobe. No  hemorrhage.  2.  Atrophy of the brain.  White matter changes consistent with  sequelae of small vessel ischemic disease.  3. Arachnoid cyst in the left sylvian fissure extending superiorly  along the lateral left frontal lobe.     CORRINA GUILLEN MD   Head angiogram CT w & w/o contrast    Narrative    CT ANGIOGRAM OF THE HEAD AND NECK WITHOUT AND WITH CONTRAST   11/17/2017 4:03 PM     HISTORY: Left eye vision changes, nonradiating headache of sudden  onset at the same time as vision changes.    TECHNIQUE:  Precontrast localizing scans were followed by CT  angiography with an injection of 70 mL [id number]-370 IV with scans  through the head and neck.  Images were transferred to a separate 3-D  workstation where multiplanar reformations and 3-D images were  created.  Estimates of carotid stenoses are made relative to the  distal internal carotid artery diameters except as noted. Radiation  dose for this scan was reduced using automated exposure control,  adjustment of the mA and/or kV according to patient size, or iterative  reconstruction technique.     COMPARISON: None.    CT HEAD FINDINGS:  No contrast  enhancing lesions.   Cerebral blood  flow is grossly normal. Arachnoid cyst in the left sylvian fissure and  lateral to left frontal lobe.    CT ANGIOGRAM HEAD FINDINGS:  Minimal calcified atherosclerotic plaque  left carotid siphon. Arteries are widely patent with no aneurysm,  significant stenosis, occlusion or intraarterial thrombus.  Venous  circulation is unremarkable.     CT ANGIOGRAM NECK FINDINGS:   Right carotid artery: Tortuous cervical right internal carotid artery.   No significant stenosis.      Left carotid artery: Tortuous cervical internal carotid artery. Mild  calcified plaque in the distal common carotid and proximal internal  carotid. No significant stenosis.      Vertebral arteries:  Dominant right vertebral artery appears widely  patent. Small left vertebral artery ends mostly in the PICA as a  normal variant. There is a small threadlike branch to the basilar  artery.  No significant stenosis.      Other findings: Atrophic right submandibular gland.      Impression    IMPRESSION:   1. Tortuous internal carotid arteries.  2. Mild calcified plaque distal left common carotid artery and  proximal left internal carotid artery and in the left carotid siphon.  3. No significant stenosis. No arterial occlusion.    CORRINA GUILLEN MD   POC US SOFT TISSUE    Narrative    Indication: Left eye vision changes  Technique: A linear probe was used to evaluate the left eye  No evidence of left eye retinal detachment or vitreous hemorrhage.  Images were unable to be saved to PACS due to dysfunction with the ultrasound up loading system.      Impression    No evidence of left eye retinal detachment   MR Brain w/o & w Contrast    Narrative    MRI OF THE BRAIN WITHOUT AND WITH CONTRAST 11/17/2017 4:58 PM     COMPARISON: Head CT same day.    HISTORY: Left eye vision change, abnormality on MRI, evaluate for  occipital stroke.    TECHNIQUE: Axial diffusion-weighted with ADC map, axial T2-weighted  with fat saturation,  axial T1-weighted, axial turboFLAIR and coronal  T1-weighted images of the brain were acquired without intravenous  contrast.  Following intravenous administration of gadolinium (15 mL  Gadavist), axial T1-weighted images of the brain were acquired.     FINDINGS: There are recent infarct scattered throughout the right  occipital lobe. The largest infarct involves the cortex at the medial  aspect of the right occipital lobe and corresponds to the hypodensity  noted in the right occipital lobe on the comparison head CT. No other  recent infarcts noted.    There is moderate diffuse cerebral volume loss. There are numerous  scattered focal areas of abnormal T2 signal hyperintensity in the  cerebral white matter bilaterally that are consistent with sequela of  chronic small vessel ischemic disease. A left middle cranial fossa  arachnoid cyst is again noted.    The ventricles and basal cisterns are within normal limits in  configuration given the degree of cerebral volume loss. There is no  midline shift. There are no extra-axial fluid collections. There is no  evidence for stroke or acute intracranial hemorrhage. There is no  abnormal contrast enhancement in the brain or its coverings.    There is no sinusitis or mastoiditis.      Impression    IMPRESSION:   1. Recent small infarcts scattered throughout the right occipital  lobe. No other recent infarct.  2. Diffuse cerebral volume loss and cerebral white matter changes  consistent with chronic small vessel ischemic disease.    SUZY DIXON MD        Consultations:  Consultation during this admission received from cardiology and neurology.       Recent Lab Results:    Recent Labs  Lab 11/18/17  0650 11/17/17  1510   WBC 10.3 11.1*   HGB 15.6 17.5*   HCT 47.3* 52.0*   MCV 84 84    248       Recent Labs  Lab 11/18/17  0650 11/17/17  1510    138   POTASSIUM 3.5 3.7   CHLORIDE 103 102   CO2 30 30   ANIONGAP 6 6   * 103*   BUN 20 20   CR 0.75 0.73    GFRESTIMATED 77 79   GFRESTBLACK >90 >90   RAGHU 8.4* 8.9   PROTTOTAL 6.4*  --    ALBUMIN 3.0*  --    BILITOTAL 0.4  --    ALKPHOS 64  --    AST 13  --    ALT 20  --        Recent Labs  Lab 11/17/17  1510   TSH 4.14*          Pending Results:    Unresulted Labs Ordered in the Past 30 Days of this Admission     No orders found from 9/18/2017 to 11/18/2017.         These results will be followed up by patient's primary care provider.    Discharge Instructions and Follow-Up:     Discharge Procedure Orders  Occupational Therapy Referral   Referral Type: Occupational Therapy     Reason for your hospital stay   Order Comments: You were hospitalized for a new stroke that caused blurry vision.  This is is likely from a clot forming in your heart from atrial fibrillation, an abnormal hear rhythm that you are going in and out of.  We recommend a blood thinner indefinitely which we discussed to prevent stroke.  Stop taking your aspirin due to increased bleeding risk with this.  Recommend not driving until vision symptoms improve and you get OK from you clinic provider.  Your metoprolol dose has been increased to help with heart rate control for the afib.     Follow-up and recommended labs and tests    Order Comments: Follow up with primary care provider, [facility], within 7 days to evaluate medication change for stroke.  No follow up labs or test are needed.     Activity   Order Comments: Your activity upon discharge: activity as tolerated   Order Specific Question Answer Comments   Is discharge order? Yes      Full Code     Diet   Order Comments: Follow this diet upon discharge: Orders Placed This Encounter     Combination Diet Regular Diet Adult; Thin Liquids (water, ice chips, juice, milk, gelatin, ice cream, etc)   Order Specific Question Answer Comments   Is discharge order? Yes          Recommendations:  -xarelto 20mg daily  -stop pta aspirin  -increase metoprolol to 50mg bid  -f/u pcp within the  week    I, Favio Fortune, personally saw the patient today and spent greater than 30 minutes discharging this patient.      Favio Fortune MD

## 2017-11-19 NOTE — PROGRESS NOTES
11/19/17 0945   Quick Adds   Type of Visit Initial Occupational Therapy Evaluation   Living Environment   Lives With spouse   Living Arrangements house   Home Accessibility stairs (1 railing present)   Transportation Available family or friend will provide   Self-Care   Dominant Hand right   Usual Activity Tolerance good   Current Activity Tolerance good   Regular Exercise no   Equipment Currently Used at Home none   Activity/Exercise/Self-Care Comment Pt. independent at baseline.   Functional Level Prior   Ambulation 1-->assistive equipment   Transferring 1-->assistive equipment   Toileting 0-->independent   Bathing 0-->independent   Dressing 0-->independent   Eating 0-->independent   Communication 0-->understands/communicates without difficulty   Swallowing 0-->swallows foods/liquids without difficulty   Cognition 0 - no cognition issues reported   Fall history within last six months (pt. reports balance concerns at times)   Prior Functional Level Comment Pt. does report concerns with balance and uses a cane to assist.   General Information   Onset of Illness/Injury or Date of Surgery - Date 11/17/17   Referring Physician Augusto Vines   Patient/Family Goals Statement return to home and open to OP OT for vision concerns   Additional Occupational Profile Info/Pertinent History of Current Problem 67 year old female w/ hx of HTN, HLD, morbid obesity, and pre-diabetes who was admitted on 11/17/2017 with blurry L vision.  Found to have multiple R occipital lobe CVAs on brain MRI.  Also found to be in afib with RVR   Precautions/Limitations no known precautions/limitations   General Observations Pt. seated in bed and open to therapy.   Cognitive Status Examination   Orientation orientation to person, place and time   Level of Consciousness alert   Able to Follow Commands WNL/WFL   Personal Safety (Cognitive) WNL/WFL   Memory intact   Attention No deficits were identified   Organization/Problem Solving No deficits were  identified   Executive Function No deficits were identified   Visual Perception   Visual Acuity no concerns   Visual Field left lower quadrant field cut, fuzzy, pt. described kalidescope like images with some improvement from stars and diamonds to blurry dull colors   Visual Attention no concerns   Occulomotor no concerns   Visual Perception Comments blurry, field cut in L eye, but no reports of increased dizziness or inattention   Sensory Examination   Sensory Comments no concerns   Pain Assessment   Patient Currently in Pain No   Posture   Posture not impaired   Range of Motion (ROM)   ROM Comment WFL   Strength   Strength Comments WFL, pt. does note that strengthening assisted with ankle and wondering if needs to strengthen LE and core to assist with balance   Hand Strength   Hand Strength Comments no concerns   Coordination   Upper Extremity Coordination No deficits were identified   Mobility   Bed Mobility Comments independent   Transfer Skills   Transfer Comments independent   Balance   Balance Comments balance concerns at baseline, slight   Upper Body Dressing   Level of [place]: Dress Upper Body independent   Lower Body Dressing   Level of [place]: Dress Lower Body independent   Toileting   Level of [place]: Toilet independent   Grooming   Level of [place]: Grooming independent   Eating/Self Feeding   Level of [place]: Eating independent   Instrumental Activities of Daily Living (IADL)   IADL Comments pt. currently unable to drive per MD recommendations   Activities of Daily Living Analysis   Impairments Contributing to Impaired Activities of Daily Living (vision impairment)   General Therapy Interventions   Planned Therapy Interventions ADL retraining;visual perception   Clinical Impression   Criteria for Skilled Therapeutic Interventions Met yes, treatment indicated   OT Diagnosis impaired visual perceptual skills for independence with ADLs and IADLs   Influenced by the following  "impairments see above   Assessment of Occupational Performance 1-3 Performance Deficits   Identified Performance Deficits decreased visual perception for tasks, decreased driving   Clinical Decision Making (Complexity) Low complexity   Therapy Frequency other (see comments)   Predicted Duration of Therapy Intervention (days/wks) one time eval and treat   Risks and Benefits of Treatment have been explained. Yes   Patient, Family & other staff in agreement with plan of care Yes   Clinical Impression Comments Pt. is 68 yo female presents with CVA impairing L field cut and full participation and independence in ADLs and IADLs.  Skilled IP OT necessary for ADL training for safe discharge and recommendations for OP OT.   [facility] AM-PAC TM \"6 Clicks\"   2016, Trustees of [facility], under license to [employer].  All rights reserved.   6 Clicks Short Forms Daily Activity Inpatient Short Form   [facility] AM-PAC  \"6 Clicks\" Daily Activity Inpatient Short Form   1. Putting on and taking off regular lower body clothing? 4 - None   2. Bathing (including washing, rinsing, drying)? 4 - None   3. Toileting, which includes using toilet, bedpan or urinal? 4 - None   4. Putting on and taking off regular upper body clothing? 4 - None   5. Taking care of personal grooming such as brushing teeth? 4 - None   6. Eating meals? 4 - None   Daily Activity Raw Score (Score out of 24.Lower scores equate to lower levels of function) 24   Total Evaluation Time   Total Evaluation Time (Minutes) 10     "

## 2017-11-19 NOTE — CONSULTS
NEUROLOGY CONSULTATION      HISTORY OF PRESENT ILLNESS:  I was asked to see Es Valdez, this 67-year-old right-handed white female, with an extensive past medical history of hypertension, obesity, hypercholesterolemia, dyslipidemia.  She presented to the Emergency Department with complaints of left-sided headache and loss of vision on the left side.  This developed several days prior to her presentation.  She denied any other neurological symptoms; numbness, tingling, weakness, speech disturbances or cognitive changes.  She had taken over-the-counter nonsteroidal anti-inflammatories for her headache.  On presentation to the Emergency Room, the patient was noted to be in atrial fibrillation, although she denied any palpitations, lightheadedness or dizziness.      PAST MEDICAL HISTORY/ FAMILY AND SOCIAL HISTORY:  Chart was reviewed.      REVIEW OF SYSTEMS:  A comprehensive review of systems was performed with the patient.  In addition to that documented in the medical record, no significant additions or deletions.      LABS:  Unremarkable.      NEUROLOGIC EXAMINATION:  The patient is in her hospital bed.  She is an obese white female in no distress.  She was alert and fully oriented with no cognitive language or aphasic deficits.  Pupils were brisk and symmetrically responsive.  Extraocular muscles were full in all fields of gaze.  Confrontation fields, however, were abnormal, and a left homonymous visual field deficit, confined primarily to the left lower quadrant.  The patient exhibited macular sparing.  In terms of central 10 degrees of her vision, it was intact on right and left sides.  Additionally, she was able to detect movement in the extreme periphery, consistent with a scleral crescent preserved visual field in the extreme left leatha-field.  Remainder of the patient's cranial nerves were intact.  Palate elevated equally and symmetrically.  Tongue was in the midline.  Sternocleidomastoid strength was  5/5.  Motor strength was 5/5 in upper and lower extremities.  Plantar responses were downgoing.  Reflexes were +1 in the upper extremities and symmetric, and absent in the lower extremities.  Coordination in the upper extremities was intact to rapid alternating and rapid successive movements and finger-to-nose testing.  No rebound or pronator drift was appreciated.  Heel-to-shin testing was intact in lower extremities.  The patient had been ambulating to the restroom and was stable, but was not ambulated due to presence of the intravenous heparin line.      STUDIES:  Independent review, visualization and interpretation of MRI scan reveals a right occipital lesion, consistent with an infarction in the posterior cerebral artery territory.      CT angiogram of the cranial vessels and extracranial vessels was unremarkable.      Echocardiogram report was reviewed, and shows no valvular abnormalities or wall motion abnormalities.      SUMMARY:  This is a woman who presents with a 2-day history of headache and an acute left homonymous visual field defect, as well as MRI evidence of a right occipital lobe infarction.  This infarction is consistent with an embolic infarction, likely from source of non-valvular atrial fibrillation.  She has been maintained on heparin, and this was initiated following identification of the stroke; however, given the non-valvular atrial fibrillation in the absence of any history of gastrointestinal bleeding, Xarelto would be an appropriate long-term anticoagulation.  She is under treatment for other stroke/vascular risk factors, and would maintain treatment of her hyperlipidemia.  I would recommend that she have outpatient occupational therapy aimed at visual rehabilitation.  She may have some driving restrictions and limitations ultimately, but for the time being, she should not be driving until she has been fully assessed.  In the outpatient setting, she may have a visual field assessment in  conjunction with visual occupational therapy.  Ultimately, driving assessment may need to be performed prior to the patient resuming operating a motor vehicle.  These restrictions were explained to the patient, as well as the need for outpatient occupational therapy.      TIME:  I spent approximately 90 minutes in preparation, review of records, and preparation of this report related to this consultation.         CESAR GILMAN MD             D: 2017 20:19   T: 2017 22:57   MT: [id number]      Name:     YAQUELIN GORDON   MRN:      [id number]        Account:       [id number]   :      1950           Consult Date:  2017      Document: [id number]       cc: [facility]

## 2017-11-20 LAB — INTERPRETATION ECG - MUSE: NORMAL

## 2017-12-04 ENCOUNTER — HOSPITAL ENCOUNTER (OUTPATIENT)
Dept: OCCUPATIONAL THERAPY | Facility: CLINIC | Age: 67
Setting detail: THERAPIES SERIES
End: 2017-12-04
Attending: INTERNAL MEDICINE
Payer: MEDICARE

## 2017-12-04 PROCEDURE — G8990 OTHER PT/OT CURRENT STATUS: HCPCS | Mod: GO,CI | Performed by: OCCUPATIONAL THERAPIST

## 2017-12-04 PROCEDURE — 40000249 ZZH STATISTIC VISIT LOW VISION CLINIC: Performed by: OCCUPATIONAL THERAPIST

## 2017-12-04 PROCEDURE — 97166 OT EVAL MOD COMPLEX 45 MIN: CPT | Mod: GO | Performed by: OCCUPATIONAL THERAPIST

## 2017-12-04 PROCEDURE — 97535 SELF CARE MNGMENT TRAINING: CPT | Mod: GO | Performed by: OCCUPATIONAL THERAPIST

## 2017-12-04 PROCEDURE — G8991 OTHER PT/OT GOAL STATUS: HCPCS | Mod: GO,CI | Performed by: OCCUPATIONAL THERAPIST

## 2017-12-05 NOTE — PROGRESS NOTES
12/04/17 1000   Visit Type   Type of Visit Initial   General Information   Start Of Care Date 12/04/17   Referring Physician Favio Fortune MD    Orders Evaluate And Treat As Indicated  (Low Vision; left field cut)   Date of Order 11/19/17   Medical Diagnosis Occipital stroke; blurring of visual image of left eye   Onset Of Illness/injury Or Date Of Surgery 11/17/17   Surgical/Medical history reviewed Yes   Precautions/Limitations Instructed not to drive given L vision deficits until improved and given ok.  Outpatient OT visual rehab ordered.   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  She was discharged to home on 11/19/17 with spouse.  PMH includes HTN, HLD, morbid obesity, pre-diabetes, arthritis, depression, galindo in ankle 2009, incontinence, cardiac problems (per patient on health history form), OP.   Prior ADL/IADL status (limitations due to OA, OP, etc. but overall modified I)   Prior Status Comment Patient/spouse share cooking and cleaning. Patient does her own laundry.  Spouse does the majority of the finances, but patient may have to take over - spouse with start of memory trouble.  Does own medications - uses pillbox.  Spouse is retired and home during the day.   Patient/family Goals Statement Get back to her baseline - be able to read, drive, see better.   General information comments concerns about balance - now on blood thinner   Social History/Home Environment   [facility]  [place]; with spouse)   Current Community Support Family/friend caregiver  (spouse; [place]   Patient Role/employment History  Retired  (; HS education)   Avocational Leisure: be on computer, ipad, sew (but hasn't done this for a long time), go out to eat with friends, go for drives   Pain  "Assessment   Pain Reported Yes   Pain Location L knee   Pain Scale 5/10   Comments chronic; using a SEC   Fall Risk Screen   Have you fallen 2 or more times in the past year? No   Have you fallen and had an injury in the past year? No   Is patient a fall risk? Yes   Fall screen comments Patient reports she is very clumsy and has a history of falls.  Has arthritis L knee, L hip pain, gets dizzy when 1st up.  Has had balance issues for past 5 years but has not received physical therapy for this and now has a fear of falling as she is on blood thinners after her stroke.  She \"used to be able to do so much.\"  Patient appropriate for physical therapy referral - will request.   Cognitive/Behavioral   Communication Intact   Cognitive Status Intact for evaluation process   Behavior Appropriate   Patient/family aware of diagnosis Yes   How well do you understand your eye condition? (somewhat)   Cognitive/Behavioral Comment very motivated to know more about her eye condition and to do whatever she can to improve it or function with it   Physical Status/Equipment   Physical Status Impaired balance  (cane)   Mobility equipment used Support cane   ADL Equipment used (unknown)   Visual Report   Functional Complaints Reading;Safety in mobility   Visual Complaints Phantom vision;Visual fatigue;Difficulty maintaining focus  (L visual field deficit)   Complaints comments doesn't think she has double vision, but not sure - describes it as \"almost strobe-light-like\"   Dinh Rodarte Symptoms? Yes  (shapes, colors and lights - all moving; better than before)   Reading glasses Single Lens   Technology Computer  (ipad, smartphone, desk top, lap top)   Equipment comments Dinh Rodarte: had one instance of seeing something on R, but all other has been on L   Lighting and Glare   Is your lighting adequate? Yes/ at home   Is glare a problem? No/ indoors;No/ outdoors   Are you satisfied with your sunglasses? Yes  (though doesn't wear them " very often)   Sunglass filter color (not sure)   Visual Acuity   Acuity both eyes together Near vision via [employer] Text Card with task light: 20/25 with 2.0 cheaters   Contrast Sensitivity   Contrast sensitivity (score/25) 25/25   Visual Skills for Reading   Test print size (1M)   Reading accuracy 98.3%   Reading speed 157 wpm   VSRT Comments at least 1 of the 2 errors likely due to L visual field deficit   Functional Reading Screen   Reading screen comments Oculomotor: Smooth pursuits WNLs.  Convergence screen: target at 12 inches, patient c/o 3 images stacked on top of each other; with cheaters: at 7 inches, the 3 images stacking up.   Convergence appears BNLs - will monitor.   Dynavision: Evaluation of visual skills/search of extra personal space via 5X4 foot computerized light board with 64 stimuli.  The user reacts as quickly as possible by striking the lights as they turn on in random succession.   Dynavision Cascade Dynavision Mode A (single stimulus attention, 1 minute;Dynavision Mode A Continuous (single stimulus attention, 4 minutes;Dynavision Mode B (timed attention, 1 minute);Dynavision Mode B Timed Divided Attention (1-second flash rate, 1 minute)   Dynavision Mode A (single stimulus attention, 1 minute)   Patient Score (Mode A, 1 min) 57   Dynavision Mode A (SSA, 1 Min) Comment .10-.15 sec. slower in L quadrants   Dynavision Mode B (timed attention, 1 minute)   Flash rate 1.0   Patient score (Mode B, 1 min) 46   Dynavision Mode B Comment slightly higher accuracy on L quadrants - compensated well after educated in Mode A with less speed in L quadrants   Clinical Impression, OT Eval   Criteria for Skilled Therapeutic Interventions Met yes   Therapy  Diagnosis: Impaired ADL/IADL with deficits in Reading based ADL;Home management;Financial management   Impairment comments also impaired with functional mobilty with L visual field deficit and pre-existing physical limitations   Assessment of Occupational  Performance 5 or more Performance Deficits   Identified Performance Deficits decreased ability to complete homemaking tasks (cooking, cleaning, laundry), decreased ability to drive, read, complete finances   Clinical Decision Making (Complexity) Moderate complexity   Clinical Impression Comments Appropriate for education in falls prevention as well.  Will seek physical therapy evaluation for balance.   OT Visual Rehabilitation Evaluation Plan   Therapy Plan Occupational therapy intervention   Planned Interventions Visual field loss awareness;Visual skills training for near tasks;Visual skills training for safety in mobility;Visual skills training for location of objects;Low vision compensatory training for reading;Low vision compensatory trainging for financial management;Computer modifications  (falls prevention)   Frequency / Duration 2x/week x 12 weeks, decreasing frequency prn   Risks and Benefits of Treatment have been explained. Yes   Patient, Family in agreement with plan of care Yes   GOALS   Goals Near Vision;Visual Field;Environmental Modification;7   Goal 1 - Near Vision   Goal Description: Near Vision Patient will verbalize awareness of visual field Loss and demonstrate improved use of visual skills/adaptive equipment for increased independence in reading-based activities of daily living, written communication and detail ADL tasks.   Target Date 02/26/18   Goal 2 - Visual field   Goal Description: Visual field Patient will verbalize awareness of visual field loss and demonstrate improved use of visual skills for increased safety/ independence in locating objects/obstacles and in navigation   Visual Field Goal Comment 90% accuracy on intrapersonal and extrapersonal space in L visual field (pen/paper scanning and Dynavision and Scancourse)   Target Date 02/26/18   Goal 3 - Environmental modification   Goal Description: Environment modification Patient will demonstrate understanding of the impact of  lighting, contrast and glare on ADL activities, in conjunction with environmentally-based ADL modifications   Environmental Modification Goal Comment Primary focus on lighting and placement of objects in her environment for optimal independence   Target Date 02/26/18   Goal 7   Goal Description Patient to verbalize 3 strategies to decrease her risk of falls through adapted equipment and adaptive techniques in the home and community setting (modifications to the home, use of AE, etc.) for decreased risk of falls during ADL/IADLs.    Target Date 02/26/18   Total Evaluation Time   Total Evaluation Time 51   Therapy Certification   Certification date from 12/04/17   Certification date to 02/26/17   Medical Diagnosis Occipital stroke; blurring of visual image of left eye   Certification I certify the need for these services furnished under this plan of treatment and while under my care.  (Physician co-signature of this document indicates review and certification of the therapy plan).

## 2017-12-05 NOTE — PROGRESS NOTES
[facility]          OUTPATIENT OCCUPATIONALTHERAPY  EVALUATION  PLAN OF TREATMENT FOR OUTPATIENT REHABILITATION  (COMPLETE FOR INITIAL CLAIMS ONLY)  Patient's Last Name, First Name, M.I.  YOB: 1950  Es Valdez      Provider's Name  [facility] Medical Record No.  [id number]   Onset Date:  11/17/17 Start of Care Date:  12/04/17   Type:     ___PT  _X_OT   ___SLP Medical Diagnosis:  Occipital stroke; blurring of visual image of left eye   Therapy Diagnosis: Impaired ADL/IADL with deficits in Reading based ADL, Home management, Financial management  also impaired with functional mobilty with L visual field deficit and pre-existing physical limitations Visits from SOC: 1     _________________________________________________________________________________  Plan of Treatment/Functional Goals:  Planned Interventions: Visual field loss awareness, Visual skills training for near tasks, Visual skills training for safety in mobility, Visual skills training for location of objects, Low vision compensatory training for reading, Low vision compensatory trainging for financial management, Computer modifications (falls prevention)        Goals  1. Patient will verbalize awareness of visual field Loss and demonstrate improved use of visual skills/adaptive equipment for increased independence in reading-based activities of daily living, written communication and detail ADL tasks.         Target Date: 02/26/18      2. Patient will verbalize awareness of visual field loss and demonstrate improved use of visual skills for increased safety/ independence in locating objects/obstacles and in navigation    90% accuracy on intrapersonal and extrapersonal space in L visual field (pen/paper scanning and Dynavision and Scancourse)    Target Date: 02/26/18      3.  Patient will demonstrate  understanding of the impact of lighting, contrast and glare on ADL activities, in conjunction with environmentally-based ADL modifications    Primary focus on lighting and placement of objects in her environment for optimal independence    Target Date: 02/26/18      4.                  5.                   6.                    7.  Patient to verbalize 3 strategies to decrease her risk of falls through adapted equipment and adaptive techniques in the home and community setting (modifications to the home, use of AE, etc.) for decreased risk of falls during ADL/IADLs.      Target Date: 02/26/18        8.                            Therapy Frequency/Duration:  2x/week x 12 weeks, decreasing frequency prn    Shania Burger OTR/L, OT       I CERTIFY THE NEED FOR THESE SERVICES FURNISHED UNDER        THIS PLAN OF TREATMENT AND WHILE UNDER MY CARE .             Physician Signature               Date    X_____________________________________________________                          Certification date from: 12/04/17 Certification date to: 02/26/18          Referring Physician: Favio Fortune MD; **Will send cert. To Dr. Feroz Ponce (patient's primary MD) as she does not plan to see Dr. Fortune again.     Initial Assessment        See Epic Evaluation Start Of Care Date: 12/04/17     Shania Burger OTR/L

## 2017-12-14 ENCOUNTER — HOSPITAL ENCOUNTER (OUTPATIENT)
Dept: OCCUPATIONAL THERAPY | Facility: CLINIC | Age: 67
Setting detail: THERAPIES SERIES
End: 2017-12-14
Attending: INTERNAL MEDICINE
Payer: MEDICARE

## 2017-12-14 PROCEDURE — 40000249 ZZH STATISTIC VISIT LOW VISION CLINIC: Performed by: OCCUPATIONAL THERAPIST

## 2017-12-14 PROCEDURE — 97535 SELF CARE MNGMENT TRAINING: CPT | Mod: GO | Performed by: OCCUPATIONAL THERAPIST

## 2017-12-15 NOTE — ADDENDUM NOTE
Encounter addended by: Shania Burger, OT on: 12/15/2017  8:11 AM<BR>     Actions taken: Sign clinical note

## 2017-12-18 ENCOUNTER — HOSPITAL ENCOUNTER (OUTPATIENT)
Dept: OCCUPATIONAL THERAPY | Facility: CLINIC | Age: 67
Setting detail: THERAPIES SERIES
End: 2017-12-18
Attending: INTERNAL MEDICINE
Payer: MEDICARE

## 2017-12-18 PROCEDURE — 97535 SELF CARE MNGMENT TRAINING: CPT | Mod: GO | Performed by: OCCUPATIONAL THERAPIST

## 2017-12-18 PROCEDURE — 40000249 ZZH STATISTIC VISIT LOW VISION CLINIC: Performed by: OCCUPATIONAL THERAPIST

## 2018-01-02 ENCOUNTER — HOSPITAL ENCOUNTER (OUTPATIENT)
Dept: OCCUPATIONAL THERAPY | Facility: CLINIC | Age: 68
Setting detail: THERAPIES SERIES
End: 2018-01-02
Attending: INTERNAL MEDICINE
Payer: MEDICARE

## 2018-01-02 PROCEDURE — 40000125 ZZHC STATISTIC OT OUTPT VISIT: Performed by: OCCUPATIONAL THERAPIST

## 2018-01-02 PROCEDURE — 97535 SELF CARE MNGMENT TRAINING: CPT | Mod: GO | Performed by: OCCUPATIONAL THERAPIST

## 2018-01-04 ENCOUNTER — HOSPITAL ENCOUNTER (OUTPATIENT)
Dept: OCCUPATIONAL THERAPY | Facility: CLINIC | Age: 68
Setting detail: THERAPIES SERIES
End: 2018-01-04
Attending: INTERNAL MEDICINE
Payer: MEDICARE

## 2018-01-04 PROCEDURE — 40000125 ZZHC STATISTIC OT OUTPT VISIT: Performed by: OCCUPATIONAL THERAPIST

## 2018-01-04 PROCEDURE — 97535 SELF CARE MNGMENT TRAINING: CPT | Mod: GO | Performed by: OCCUPATIONAL THERAPIST

## 2018-01-08 ENCOUNTER — HOSPITAL ENCOUNTER (OUTPATIENT)
Dept: OCCUPATIONAL THERAPY | Facility: CLINIC | Age: 68
Setting detail: THERAPIES SERIES
End: 2018-01-08
Attending: INTERNAL MEDICINE
Payer: MEDICARE

## 2018-01-08 PROCEDURE — 40000249 ZZH STATISTIC VISIT LOW VISION CLINIC: Performed by: OCCUPATIONAL THERAPIST

## 2018-01-08 PROCEDURE — 97535 SELF CARE MNGMENT TRAINING: CPT | Mod: GO | Performed by: OCCUPATIONAL THERAPIST

## 2018-01-15 ENCOUNTER — HOSPITAL ENCOUNTER (OUTPATIENT)
Dept: OCCUPATIONAL THERAPY | Facility: CLINIC | Age: 68
Setting detail: THERAPIES SERIES
End: 2018-01-15
Attending: INTERNAL MEDICINE
Payer: MEDICARE

## 2018-01-15 PROCEDURE — 40000125 ZZHC STATISTIC OT OUTPT VISIT

## 2018-01-15 PROCEDURE — 97535 SELF CARE MNGMENT TRAINING: CPT | Mod: GO

## 2018-01-18 ENCOUNTER — HOSPITAL ENCOUNTER (OUTPATIENT)
Dept: PHYSICAL THERAPY | Facility: CLINIC | Age: 68
Setting detail: THERAPIES SERIES
End: 2018-01-18
Attending: FAMILY MEDICINE
Payer: MEDICARE

## 2018-01-18 ENCOUNTER — HOSPITAL ENCOUNTER (OUTPATIENT)
Dept: OCCUPATIONAL THERAPY | Facility: CLINIC | Age: 68
Setting detail: THERAPIES SERIES
End: 2018-01-18
Attending: INTERNAL MEDICINE
Payer: MEDICARE

## 2018-01-18 PROCEDURE — G8978 MOBILITY CURRENT STATUS: HCPCS | Mod: GP,CK | Performed by: PHYSICAL THERAPIST

## 2018-01-18 PROCEDURE — G8979 MOBILITY GOAL STATUS: HCPCS | Mod: GP,CI | Performed by: PHYSICAL THERAPIST

## 2018-01-18 PROCEDURE — 97535 SELF CARE MNGMENT TRAINING: CPT | Mod: GO | Performed by: OCCUPATIONAL THERAPIST

## 2018-01-18 PROCEDURE — 97162 PT EVAL MOD COMPLEX 30 MIN: CPT | Mod: GP | Performed by: PHYSICAL THERAPIST

## 2018-01-18 PROCEDURE — 40000125 ZZHC STATISTIC OT OUTPT VISIT: Performed by: OCCUPATIONAL THERAPIST

## 2018-01-18 PROCEDURE — 40000719 ZZHC STATISTIC PT DEPARTMENT NEURO VISIT: Performed by: PHYSICAL THERAPIST

## 2018-01-26 NOTE — PROGRESS NOTES
[facility]        OUTPATIENT PHYSICAL THERAPY FUNCTIONAL EVALUATION  PLAN OF TREATMENT FOR OUTPATIENT REHABILITATION  (COMPLETE FOR INITIAL CLAIMS ONLY)  Patient's Last Name, First Name, M.I.  YOB: 1950  Es Valdez     Provider's Name   [facility]   Medical Record No.  [id number]     Start of Care Date:  01/18/18   Onset Date:  11/17/17   Type:     _X__PT   ____OT  ____SLP Medical Diagnosis:  acute to subacute embolic R occipital lobe CVAs     PT Diagnosis:  impaired balance and coordination; decreased endurance and exercise tolerance Visits from SOC:  1                              __________________________________________________________________________________  Plan of Treatment/Functional Goals:  balance training, gait training, neuromuscular re-education, strengthening, stretching, transfer training           GOALS  1- DGI  Es will reach a score of 18/24 on the DGI to be able to safely ambulate at the grocery store.  02/17/18    2- strength  Es will achieve 5/5 strength in her hip flexors and abductions to be able to ambulate in the community safer.  02/17/18    3- safety  Es will be able to step over an object on the floor without loss of balance and use of her cane to be able to climb curbs in the community.  02/17/18    4- HEP  Es will be able to follow her independent HEP with handouts and no assistance to be able to continue exercising and continue to strengthen her LE to be able to climb stairs in her home.  02/17/18    5- balance  Es will be able to stand on one leg for 10 seconds without loss of balance to be able to step over an object on the ground safely.  02/17/18                                     Therapy Frequency:  2 times/Week   Predicted Duration of Therapy Intervention:  30 days    Gris Vega,  PT                                    I CERTIFY THE NEED FOR THESE SERVICES FURNISHED UNDER        THIS PLAN OF TREATMENT AND WHILE UNDER MY CARE .             Physician Signature               Date    X_____________________________________________________                      Certification Date From:  01/18/18   Certification Date To:  02/17/18    Referring Provider:  Feroz Ponce MD    Initial Assessment  See Epic Evaluation- Start of Care Date: 01/18/18

## 2018-01-26 NOTE — ADDENDUM NOTE
Encounter addended by: Gris Vega, PT on: 1/26/2018  1:32 PM<BR>     Actions taken: Sign clinical note

## 2018-01-26 NOTE — ADDENDUM NOTE
Encounter addended by: Gris Vega, PT on: 1/26/2018 11:16 AM<BR>     Actions taken: Sign clinical note, Flowsheet accepted

## 2018-02-01 ENCOUNTER — HOSPITAL ENCOUNTER (OUTPATIENT)
Dept: OCCUPATIONAL THERAPY | Facility: CLINIC | Age: 68
Setting detail: THERAPIES SERIES
End: 2018-02-01
Attending: INTERNAL MEDICINE
Payer: MEDICARE

## 2018-02-01 PROCEDURE — 40000125 ZZHC STATISTIC OT OUTPT VISIT: Performed by: OCCUPATIONAL THERAPIST

## 2018-02-01 PROCEDURE — 97535 SELF CARE MNGMENT TRAINING: CPT | Mod: GO | Performed by: OCCUPATIONAL THERAPIST

## 2018-02-08 ENCOUNTER — HOSPITAL ENCOUNTER (OUTPATIENT)
Dept: OCCUPATIONAL THERAPY | Facility: CLINIC | Age: 68
Setting detail: THERAPIES SERIES
End: 2018-02-08
Attending: INTERNAL MEDICINE
Payer: MEDICARE

## 2018-02-08 PROCEDURE — G8991 OTHER PT/OT GOAL STATUS: HCPCS | Mod: GO,CI | Performed by: OCCUPATIONAL THERAPIST

## 2018-02-08 PROCEDURE — 97535 SELF CARE MNGMENT TRAINING: CPT | Mod: GO | Performed by: OCCUPATIONAL THERAPIST

## 2018-02-08 PROCEDURE — G8992 OTHER PT/OT  D/C STATUS: HCPCS | Mod: GO,CI | Performed by: OCCUPATIONAL THERAPIST

## 2018-02-08 PROCEDURE — 40000249 ZZH STATISTIC VISIT LOW VISION CLINIC: Performed by: OCCUPATIONAL THERAPIST

## 2018-02-08 NOTE — PROGRESS NOTES
OCCUPATIONAL THERAPY VISUAL REHABILITATION DISCHARGE SUMMARY     Patient: Es Valdez  : 1950  Insurance:   Payor/Plan Subscriber Name Rel Member # Group #   MEDICARE - MEDICARE ES THAPA*  [id number]       ATTN CLAIMS, [address]   [employer] - ES GODINEZ*  [id number]      [address]       Beginning/End Dates of Reporting Period:  17 to 2018    Therapy Diagnosis:    Occipital stroke; blurring of visual image of left eye       Client Self Report: Patient has a field of vision test at eye MD after a bit of hassle.  They didn't do one at recent eye appointment, apparently due to insurance concerns.  Overall, she is feeling really good about her visual field deficit.  Feels like she is now compensating with less effort and thought.    GOALS     Goal 1 - Near vision    Goal Description: Near Vision: Patient will verbalize awareness of visual field Loss and demonstrate improved use of visual skills/adaptive equipment for increased independence in reading-based activities of daily living, written communication and detail ADL tasks.       Target Date: 18        Goal 2 - Visual field    Goal Description: Visual field: Patient will verbalize awareness of visual field loss and demonstrate improved use of visual skills for increased safety/ independence in locating objects/obstacles and in navigation   Visual Field Goal Comment: 90% accuracy on intrapersonal and extrapersonal space in L visual field (pen/paper scanning and Dynavision and Scancourse)   Target Date: 18        Goal 3 - Environmental modification    Goal Description: Environment modification: Patient will demonstrate understanding of the impact of lighting, contrast and glare on ADL activities, in conjunction with environmentally-based ADL modifications   Environmental Modification Goal Comment: Primary focus on lighting and placement of objects in her environment for optimal independence   Target  Date: 02/26/18        Goal 4 - Resource education                     Goal 5 - Distance viewing                     Goal 6 - Cognition                     Goal 7    Goal Description: Patient to verbalize 3 strategies to decrease her risk of falls through adapted equipment and adaptive techniques in the home and community setting (modifications to the home, use of AE, etc.) for decreased risk of falls during ADL/IADLs.    Target Date: 02/26/18        Goal 8                   Progress Toward Goals  All goals met    Reason for Discharge  Patient has met all goals.  Patient seen for a total of 10 visits and demonstrated significant progress with compensation of L visual field cut.  Patient has been cleared to drive and is independent with home program.    Adaptive Equipment/Optical Devices Recommended:  Tint for glare management boysenberry for medium tint and dark plum for dark tint (germán days)    Discharge Plan  Patient to continue home program/techniques

## 2018-02-09 ENCOUNTER — HOSPITAL ENCOUNTER (OUTPATIENT)
Dept: PHYSICAL THERAPY | Facility: CLINIC | Age: 68
Setting detail: THERAPIES SERIES
End: 2018-02-09
Attending: FAMILY MEDICINE
Payer: MEDICARE

## 2018-02-09 PROCEDURE — 97116 GAIT TRAINING THERAPY: CPT | Mod: GP | Performed by: PHYSICAL THERAPIST

## 2018-02-09 PROCEDURE — 97110 THERAPEUTIC EXERCISES: CPT | Mod: GP | Performed by: PHYSICAL THERAPIST

## 2018-02-09 PROCEDURE — 40000719 ZZHC STATISTIC PT DEPARTMENT NEURO VISIT: Performed by: PHYSICAL THERAPIST

## 2018-02-14 ENCOUNTER — HOSPITAL ENCOUNTER (OUTPATIENT)
Dept: PHYSICAL THERAPY | Facility: CLINIC | Age: 68
Setting detail: THERAPIES SERIES
End: 2018-02-14
Attending: FAMILY MEDICINE
Payer: MEDICARE

## 2018-02-14 PROCEDURE — 97112 NEUROMUSCULAR REEDUCATION: CPT | Mod: GP | Performed by: PHYSICAL THERAPIST

## 2018-02-14 PROCEDURE — 97110 THERAPEUTIC EXERCISES: CPT | Mod: GP | Performed by: PHYSICAL THERAPIST

## 2018-02-14 PROCEDURE — 40000719 ZZHC STATISTIC PT DEPARTMENT NEURO VISIT: Performed by: PHYSICAL THERAPIST

## 2018-02-14 NOTE — ADDENDUM NOTE
Encounter addended by: Gris Vega, PT on: 2/14/2018 11:39 AM<BR>     Actions taken: Charge Capture section accepted

## 2018-02-16 ENCOUNTER — HOSPITAL ENCOUNTER (OUTPATIENT)
Dept: PHYSICAL THERAPY | Facility: CLINIC | Age: 68
Setting detail: THERAPIES SERIES
End: 2018-02-16
Attending: FAMILY MEDICINE
Payer: MEDICARE

## 2018-02-16 PROCEDURE — 97110 THERAPEUTIC EXERCISES: CPT | Mod: GP | Performed by: PHYSICAL THERAPIST

## 2018-02-16 PROCEDURE — 40000719 ZZHC STATISTIC PT DEPARTMENT NEURO VISIT: Performed by: PHYSICAL THERAPIST

## 2018-02-16 PROCEDURE — 97112 NEUROMUSCULAR REEDUCATION: CPT | Mod: GP | Performed by: PHYSICAL THERAPIST

## 2018-02-21 ENCOUNTER — HOSPITAL ENCOUNTER (OUTPATIENT)
Dept: PHYSICAL THERAPY | Facility: CLINIC | Age: 68
Setting detail: THERAPIES SERIES
End: 2018-02-21
Attending: FAMILY MEDICINE
Payer: MEDICARE

## 2018-02-21 PROCEDURE — 40000719 ZZHC STATISTIC PT DEPARTMENT NEURO VISIT: Performed by: PHYSICAL THERAPIST

## 2018-02-21 PROCEDURE — 97116 GAIT TRAINING THERAPY: CPT | Mod: GP | Performed by: PHYSICAL THERAPIST

## 2018-02-21 PROCEDURE — 97112 NEUROMUSCULAR REEDUCATION: CPT | Mod: GP | Performed by: PHYSICAL THERAPIST

## 2018-02-21 NOTE — ADDENDUM NOTE
Encounter addended by: Gris Vega, PT on: 2/21/2018 10:04 AM<BR>     Actions taken: Flowsheet accepted

## 2018-02-21 NOTE — PROGRESS NOTES
[facility]      OUTPATIENT PHYSICAL THERAPY  PLAN OF TREATMENT FOR OUTPATIENT REHABILITATION    Patient's Last Name, First Name, M.I.                YOB: 1950  Es Valdez                        Provider's Name  [facility] Medical Record No.  [id number]                               Onset Date: 11/17/17   Start of Care Date: 01/18/18   Type:     _X_PT   ___OT   ___SLP Medical Diagnosis: acute to subacute embolic R occipital lobe CVAs                       PT Diagnosis: impaired balance and coordination; decreased endurance and exercise tolerance   Visits from SOC: 5   _________________________________________________________________________________  Plan of Treatment:    Frequency/Duration: 2x per week for 30 days.  Reducing frequency as appropriate.      Goals:  Goal Identifier 1- DGI   Goal Description Es will reach a score of 18/24 on the DGI to be able to safely ambulate at the grocery store.   Target Date 02/17/18   Date Met   not met due to not being retested.  Plan to test at upcoming session.     Progress:     Goal Identifier 2- strength   Goal Description Es will achieve 5/5 strength in her hip flexors and abductors to be able to ambulate in the community more safely.   Target Date 02/17/18   Date Met   in progress   Progress:  Es feels that her walking is better and she is able to stand up taller.      Goal Identifier 3- safety   Goal Description Es will be able to step over an object on the floor without loss of balance and use of her cane to be able to climb curbs in the community.   Target Date 02/17/18   Date Met   in progress   Progress:  Es is able to step over small obstacles of 1 inch with light UE support without LOB but for higher obstacles needs more stable UE support.      Goal Identifier 4- HEP   Goal Description  Es will be able to follow her independent HEP with handouts and no assistance to be able to continue exercising and continue to strengthen her LE to be able to climb stairs in her home.   Target Date 02/17/18   Date Met   in progress   Progress:  Es has been working on her home program to date which continues to be progressed and modified as appropriate.      Goal Identifier 5- balance   Goal Description Es will be able to stand on one leg for 10 seconds without loss of balance to be able to step over an object on the ground safely.   Target Date 02/17/18   Date Met   in progress - not yet retested since eval.     Progress:     Progress Toward Goals:   Progress this reporting period: Es has had 4 visits since her initial evaluation.  She has noticed improvements in her strength and balance already and is pleased with how therapy is going. She is showing progress toward all goals.     Progress limited due to delay in scheduling the first few weeks after her evaluation due to limited openings in the schedule but over the past few weeks has been able to be scheduled consistently.  She will benefit from continued PT to fully achieve above goals and maximize functional safety at home and in the community.     Certification date from 2/18/18 to 3/20/2018.    Gris Vega, PT          I CERTIFY THE NEED FOR THESE SERVICES FURNISHED UNDER        THIS PLAN OF TREATMENT AND WHILE UNDER MY CARE     (Physician co-signature of this document indicates review and certification of the therapy plan).                Referring Provider: Delma Ruelas MD

## 2018-02-28 ENCOUNTER — HOSPITAL ENCOUNTER (OUTPATIENT)
Dept: PHYSICAL THERAPY | Facility: CLINIC | Age: 68
Setting detail: THERAPIES SERIES
End: 2018-02-28
Attending: FAMILY MEDICINE
Payer: MEDICARE

## 2018-02-28 PROCEDURE — 97110 THERAPEUTIC EXERCISES: CPT | Mod: GP | Performed by: PHYSICAL THERAPIST

## 2018-02-28 PROCEDURE — 40000719 ZZHC STATISTIC PT DEPARTMENT NEURO VISIT: Performed by: PHYSICAL THERAPIST

## 2018-02-28 PROCEDURE — 97112 NEUROMUSCULAR REEDUCATION: CPT | Mod: GP | Performed by: PHYSICAL THERAPIST

## 2018-02-28 PROCEDURE — 97116 GAIT TRAINING THERAPY: CPT | Mod: GP | Performed by: PHYSICAL THERAPIST

## 2018-03-14 ENCOUNTER — HOSPITAL ENCOUNTER (OUTPATIENT)
Dept: PHYSICAL THERAPY | Facility: CLINIC | Age: 68
Setting detail: THERAPIES SERIES
End: 2018-03-14
Attending: FAMILY MEDICINE
Payer: MEDICARE

## 2018-03-14 PROCEDURE — 97110 THERAPEUTIC EXERCISES: CPT | Mod: GP | Performed by: PHYSICAL THERAPIST

## 2018-03-14 PROCEDURE — 40000719 ZZHC STATISTIC PT DEPARTMENT NEURO VISIT: Performed by: PHYSICAL THERAPIST

## 2018-04-19 NOTE — ADDENDUM NOTE
Encounter addended by: Gris Vega, PT on: 4/19/2018  2:21 PM<BR>     Actions taken: Pend clinical note

## 2018-04-19 NOTE — PROGRESS NOTES
"Outpatient Physical Therapy Discharge Note     Patient: Es HENRY Sejnoha  : 1950    Beginning/End Dates of Reporting Period:  2018 to 3/14/2018    Referring Provider: Feroz Ponce MD    Therapy Diagnosis: impaired balance and coordination; decreased endurance and exercise tolerance     Client Self Report: Not feeling great today, states she did have blood work done on 3/2/18 and was found to have some numbers \"off\" with her red and white blood cells, thinks they were a bit high.  urinary tract infection was ruled out.  Otherwise states doc wasn't too concerned.        Goals:  Goal Identifier 1- DGI   Goal Description Es will reach a score of 18/24 on the DGI to be able to safely ambulate at the grocery store.   Target Date 18   Date Met   not met   Progress:  Unable to retest before pt decided to stop therapy.  Initial testing .       Goal Identifier 2- strength   Goal Description Es will achieve 5/5 strength in her hip flexors and abductions to be able to ambulate in the community safer.   Target Date 18   Date Met   not met   Progress:  Verbalized feeling that her legs were stronger with walking.  Not formally tested prior to last attended visit.      Goal Identifier 3- safety   Goal Description Es will be able to step over an object on the floor without loss of balance and use of her cane to be able to climb curbs in the community.   Target Date 18   Date Met   not met   Progress:  Did not re-test before pt decided she wanted to d/c.       Goal Identifier 4- HEP   Goal Description Es will be able to follow her independent HEP with handouts and no assistance to be able to continue exercising and continue to strengthen her LE to be able to climb stairs in her home.   Target Date 18   Date Met   3/14/18    Progress: met to date     Goal Identifier 5- balance   Goal Description Es will be able to stand on one leg for 10 seconds without loss of " balance to be able to step over an object on the ground safely.   Target Date 03/20/18   Date Met   not met   Progress:     Progress Toward Goals: see above.    Progress limited due to delay in scheduling after initial evaluation and then several cancels due to illness.      Plan:  Discharge from therapy.    Discharge:    Reason for Discharge: Patient chooses to discontinue therapy and continue with her home program as it is to date independently.  Medicare G-code: Patient did not attend a final scheduled session prior to discharge. Unable to determine discharge functional status.    Equipment Issued: none    Discharge Plan: Patient to continue home program.

## 2018-06-06 NOTE — ADDENDUM NOTE
Encounter addended by: Gris Vega, PT on: 6/6/2018  3:47 PM<BR>     Actions taken: Sign clinical note, Episode resolved

## 2019-09-28 ENCOUNTER — HEALTH MAINTENANCE LETTER (OUTPATIENT)
Age: 69
End: 2019-09-28

## 2020-03-15 ENCOUNTER — HEALTH MAINTENANCE LETTER (OUTPATIENT)
Age: 70
End: 2020-03-15

## 2021-01-10 ENCOUNTER — HEALTH MAINTENANCE LETTER (OUTPATIENT)
Age: 71
End: 2021-01-10

## 2021-05-08 ENCOUNTER — HEALTH MAINTENANCE LETTER (OUTPATIENT)
Age: 71
End: 2021-05-08

## 2021-10-23 ENCOUNTER — HEALTH MAINTENANCE LETTER (OUTPATIENT)
Age: 71
End: 2021-10-23

## 2021-10-30 NOTE — PROGRESS NOTES
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  This is improvig but pt is reporting issues with decreased balance , fear and difficulty with navigating curbs and impaired balance with lower body dressing tasks.     Pertinent Visual History  a small area on the Left visual field is blocked - unable to see above and below the location   Prior level of function comment ambulating in the community with a standard cane, no cane use at home, use of one railings for stairs, independent ADLs and driving.  Patient/spouse share cooking and cleaning. Patient does her own laundry.  Spouse does the majority of the finances, but patient may have to take over - spouse with start of memory trouble.  Does own medications - uses pillbox.  Spouse is retired and home during the day.   Previous/Current Treatment Occupational Therapy  (Vision therapy)   Current Community Support Family/friend caregiver  (lives with spouse)   Patient role/Employment history Retired   Living environment [facility]   Home/Community Accessibility Comments Multiple stairs with railings in home.    Current Assistive Devices Standard Cane   Assistive Devices Comments .  Started using about a year ago due to left knee pain.    Patient/Family Goals Statement improve balance to be able to stand on one leg and  put her shoe on and to be able to negotiate curbs and stairs   Fall Risk Screen   Fall screen completed by PT   Have you fallen 2 or more times in the past year? No   Have you fallen and had an injury in the past year? Yes  (may be received concussion according to pt)   Timed Up and Go score (seconds) not tested- DGI completed   Is patient a fall risk? Yes   Fall screen comments see DGI score   Pain   Patient currently in pain Yes   Pain location left knee   Pain rating not rated today   Cognitive Status Examination   Orientation orientation to person, place and time   Level of Consciousness alert   Follows Commands and Answers Questions 100% of the time   Personal Safety and Judgment intact   Memory (appears intact. see OT for detailed assessment)   Cognitive Comment no issues identified - see OT eval for details.    Posture   Posture Forward head position;Kyphosis   Strength   Strength Comments b/l hip flexors 4/5; b/l hip abd 4+/5   Transfer Skills   Transfer Comments observed to rise to stand and lower to sit with assist of UEs.     Gait   Gait Comments cane use in R hand, lateral shift of trunk bilaterally but greater over stance leg, inversion of feet with ambulation.  fatigues quickly with gait.    Gait Special Tests 25 Foot Timed Walk   Comments 1st trial-10.17 sec with 20 steps; 2nd trial- 9.97 sec with 19 steps   Gait Special Tests Dynamic Gait Index   Score out of 24 11/24   Comments no AD; initial vitals- 112 bpm, 94% O2, and 108/82; end vitals- 130 bpm, 94% O2, and 112/84; loss of balance when stepping over shoe box   Balance   Balance Comments 2 min feet together- minimal sway; 30 sec EC- minimal-moderate sway   Coordination   Coordination no deficits were identified   Muscle Tone   Muscle Tone no deficits were identified   Planned Therapy Interventions   Planned Therapy Interventions balance training;gait training;neuromuscular re-education;strengthening;stretching;transfer training   Clinical  Impression   Criteria for Skilled Therapeutic Interventions Met yes, treatment indicated   PT Diagnosis impaired balance and coordination; decreased endurance and exercise tolerance   Influenced by the following impairments weakness and fatigue   Functional limitations due to impairments safe ambulation with steps and walking in the community   Clinical Presentation Evolving/Changing   Clinical Presentation Rationale Pt presents with multiple co-morbidities including uncontrolled A fib that effect treatment with fatigue and monitoring pt's tolerance to exercise. The pt also presents with vision loss, L knee pain, decreased endurance, and balance deficits.   Clinical Decision Making (Complexity) Moderate complexity   Therapy Frequency 2 times/Week   Predicted Duration of Therapy Intervention (days/wks) 30 days   Risk & Benefits of therapy have been explained Yes   Patient, Family & other staff in agreement with plan of care Yes   GOALS   PT Eval Goals 1;2;3;4;5   Goal 1   Goal Identifier 1- DGI   Goal Description Es will reach a score of 18/24 on the DGI to be able to safely ambulate at the grocery store.   Target Date 02/17/18   Goal 2   Goal Identifier 2- strength   Goal Description Es will achieve 5/5 strength in her hip flexors and abductions to be able to ambulate in the community safer.   Target Date 02/17/18   Goal 3   Goal Identifier 3- safety   Goal Description Es will be able to step over an object on the floor without loss of balance and use of her cane to be able to climb curbs in the community.   Target Date 02/17/18   Goal 4   Goal Identifier 4- HEP   Goal Description Es will be able to follow her independent HEP with handouts and no assistance to be able to continue exercising and continue to strengthen her LE to be able to climb stairs in her home.   Target Date 02/17/18   Goal 5   Goal Identifier 5- balance   Goal Description Es will be able to stand on one leg for 10  seconds without loss of balance to be able to step over an object on the ground safely.   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

## 2022-06-04 ENCOUNTER — HEALTH MAINTENANCE LETTER (OUTPATIENT)
Age: 72
End: 2022-06-04

## 2022-10-09 ENCOUNTER — HEALTH MAINTENANCE LETTER (OUTPATIENT)
Age: 72
End: 2022-10-09

## 2023-06-10 ENCOUNTER — HEALTH MAINTENANCE LETTER (OUTPATIENT)
Age: 73
End: 2023-06-10

## 2023-10-28 ENCOUNTER — HEALTH MAINTENANCE LETTER (OUTPATIENT)
Age: 73
End: 2023-10-28